# Patient Record
Sex: FEMALE | Race: WHITE | URBAN - METROPOLITAN AREA
[De-identification: names, ages, dates, MRNs, and addresses within clinical notes are randomized per-mention and may not be internally consistent; named-entity substitution may affect disease eponyms.]

---

## 2017-01-01 ENCOUNTER — INFUSION THERAPY VISIT (OUTPATIENT)
Dept: INFUSION THERAPY | Facility: CLINIC | Age: 67
End: 2017-01-01
Payer: MEDICARE

## 2017-01-01 ENCOUNTER — CARE COORDINATION (OUTPATIENT)
Dept: ONCOLOGY | Facility: CLINIC | Age: 67
End: 2017-01-01

## 2017-01-01 ENCOUNTER — TELEPHONE (OUTPATIENT)
Dept: PHARMACY | Facility: CLINIC | Age: 67
End: 2017-01-01

## 2017-01-01 ENCOUNTER — DOCUMENTATION ONLY (OUTPATIENT)
Dept: PHARMACY | Facility: CLINIC | Age: 67
End: 2017-01-01

## 2017-01-01 ENCOUNTER — ONCOLOGY VISIT (OUTPATIENT)
Dept: ONCOLOGY | Facility: CLINIC | Age: 67
End: 2017-01-01
Payer: MEDICARE

## 2017-01-01 ENCOUNTER — MYC REFILL (OUTPATIENT)
Dept: ONCOLOGY | Facility: CLINIC | Age: 67
End: 2017-01-01

## 2017-01-01 ENCOUNTER — TELEPHONE (OUTPATIENT)
Dept: ONCOLOGY | Facility: CLINIC | Age: 67
End: 2017-01-01

## 2017-01-01 VITALS — WEIGHT: 112.6 LBS | BODY MASS INDEX: 20.59 KG/M2

## 2017-01-01 VITALS
SYSTOLIC BLOOD PRESSURE: 101 MMHG | OXYGEN SATURATION: 92 % | HEART RATE: 69 BPM | RESPIRATION RATE: 20 BRPM | TEMPERATURE: 96.6 F | DIASTOLIC BLOOD PRESSURE: 61 MMHG

## 2017-01-01 VITALS
SYSTOLIC BLOOD PRESSURE: 101 MMHG | OXYGEN SATURATION: 95 % | TEMPERATURE: 96.2 F | RESPIRATION RATE: 16 BRPM | HEART RATE: 70 BPM | WEIGHT: 108.5 LBS | BODY MASS INDEX: 19.84 KG/M2 | DIASTOLIC BLOOD PRESSURE: 64 MMHG

## 2017-01-01 VITALS
HEIGHT: 62 IN | SYSTOLIC BLOOD PRESSURE: 111 MMHG | OXYGEN SATURATION: 99 % | HEART RATE: 74 BPM | DIASTOLIC BLOOD PRESSURE: 61 MMHG | RESPIRATION RATE: 15 BRPM

## 2017-01-01 VITALS
HEART RATE: 79 BPM | BODY MASS INDEX: 19.11 KG/M2 | TEMPERATURE: 94.4 F | SYSTOLIC BLOOD PRESSURE: 104 MMHG | RESPIRATION RATE: 16 BRPM | DIASTOLIC BLOOD PRESSURE: 66 MMHG | OXYGEN SATURATION: 93 % | WEIGHT: 104.5 LBS

## 2017-01-01 DIAGNOSIS — C56.9 OVARIAN EPITHELIAL CANCER, UNSPECIFIED LATERALITY (H): ICD-10-CM

## 2017-01-01 DIAGNOSIS — C56.2 MALIGNANT NEOPLASM OF OVARY, LEFT (H): ICD-10-CM

## 2017-01-01 DIAGNOSIS — C56.9 OVARIAN CANCER, UNSPECIFIED LATERALITY (H): ICD-10-CM

## 2017-01-01 DIAGNOSIS — R11.0 NAUSEA: ICD-10-CM

## 2017-01-01 DIAGNOSIS — C56.1 MALIGNANT NEOPLASM OF OVARY, RIGHT (H): Primary | ICD-10-CM

## 2017-01-01 DIAGNOSIS — C56.9 OVARIAN CANCER, UNSPECIFIED LATERALITY (H): Primary | ICD-10-CM

## 2017-01-01 DIAGNOSIS — E87.6 HYPOKALEMIA: Primary | ICD-10-CM

## 2017-01-01 DIAGNOSIS — Z51.11 CHEMOTHERAPY MANAGEMENT, ENCOUNTER FOR: Primary | ICD-10-CM

## 2017-01-01 DIAGNOSIS — E87.6 HYPOKALEMIA: ICD-10-CM

## 2017-01-01 DIAGNOSIS — C56.1 MALIGNANT NEOPLASM OF OVARY, RIGHT (H): ICD-10-CM

## 2017-01-01 DIAGNOSIS — R79.89 ELEVATED SERUM CREATININE: ICD-10-CM

## 2017-01-01 DIAGNOSIS — E86.0 DEHYDRATION: Primary | ICD-10-CM

## 2017-01-01 DIAGNOSIS — I48.20 CHRONIC ATRIAL FIBRILLATION (H): ICD-10-CM

## 2017-01-01 DIAGNOSIS — Z51.11 CHEMOTHERAPY MANAGEMENT, ENCOUNTER FOR: ICD-10-CM

## 2017-01-01 DIAGNOSIS — R63.4 WEIGHT LOSS: ICD-10-CM

## 2017-01-01 DIAGNOSIS — C79.31 BRAIN METASTASIS: ICD-10-CM

## 2017-01-01 DIAGNOSIS — R11.0 NAUSEA: Primary | ICD-10-CM

## 2017-01-01 DIAGNOSIS — C79.31 BRAIN METASTASIS: Primary | ICD-10-CM

## 2017-01-01 DIAGNOSIS — Z74.09 IMMOBILITY: ICD-10-CM

## 2017-01-01 LAB
ALBUMIN SERPL-MCNC: 3.3 G/DL (ref 3.4–5)
ALP SERPL-CCNC: 53 U/L (ref 40–150)
ALT SERPL W P-5'-P-CCNC: 12 U/L (ref 0–50)
ANION GAP SERPL CALCULATED.3IONS-SCNC: 10 MMOL/L (ref 3–14)
ANION GAP SERPL CALCULATED.3IONS-SCNC: 8 MMOL/L (ref 3–14)
ANION GAP SERPL CALCULATED.3IONS-SCNC: 9 MMOL/L (ref 3–14)
AST SERPL W P-5'-P-CCNC: 12 U/L (ref 0–45)
BASOPHILS # BLD AUTO: 0 10E9/L (ref 0–0.2)
BASOPHILS NFR BLD AUTO: 0.4 %
BILIRUB SERPL-MCNC: 0.5 MG/DL (ref 0.2–1.3)
BUN SERPL-MCNC: 21 MG/DL (ref 7–30)
BUN SERPL-MCNC: 26 MG/DL (ref 7–30)
BUN SERPL-MCNC: 31 MG/DL (ref 7–30)
CALCIUM SERPL-MCNC: 9.1 MG/DL (ref 8.5–10.1)
CALCIUM SERPL-MCNC: 9.5 MG/DL (ref 8.5–10.1)
CALCIUM SERPL-MCNC: 9.5 MG/DL (ref 8.5–10.1)
CANCER AG125 SERPL-ACNC: 4052 U/ML (ref 0–30)
CHLORIDE SERPL-SCNC: 106 MMOL/L (ref 94–109)
CHLORIDE SERPL-SCNC: 98 MMOL/L (ref 94–109)
CHLORIDE SERPL-SCNC: 99 MMOL/L (ref 94–109)
CO2 SERPL-SCNC: 26 MMOL/L (ref 20–32)
CO2 SERPL-SCNC: 29 MMOL/L (ref 20–32)
CO2 SERPL-SCNC: 30 MMOL/L (ref 20–32)
CREAT SERPL-MCNC: 0.82 MG/DL (ref 0.52–1.04)
CREAT SERPL-MCNC: 0.91 MG/DL (ref 0.52–1.04)
CREAT SERPL-MCNC: 0.96 MG/DL (ref 0.52–1.04)
DIFFERENTIAL METHOD BLD: ABNORMAL
DIFFERENTIAL METHOD BLD: ABNORMAL
EOSINOPHIL # BLD AUTO: 0.1 10E9/L (ref 0–0.7)
EOSINOPHIL NFR BLD AUTO: 1.1 %
ERYTHROCYTE [DISTWIDTH] IN BLOOD BY AUTOMATED COUNT: 16.1 % (ref 10–15)
ERYTHROCYTE [DISTWIDTH] IN BLOOD BY AUTOMATED COUNT: 16.5 % (ref 10–15)
ERYTHROCYTE [DISTWIDTH] IN BLOOD BY AUTOMATED COUNT: 18.3 % (ref 10–15)
GFR SERPL CREATININE-BSD FRML MDRD: 58 ML/MIN/1.7M2
GFR SERPL CREATININE-BSD FRML MDRD: 61 ML/MIN/1.7M2
GFR SERPL CREATININE-BSD FRML MDRD: 69 ML/MIN/1.7M2
GLUCOSE SERPL-MCNC: 87 MG/DL (ref 70–99)
GLUCOSE SERPL-MCNC: 92 MG/DL (ref 70–99)
GLUCOSE SERPL-MCNC: 92 MG/DL (ref 70–99)
HCT VFR BLD AUTO: 38.4 % (ref 35–47)
HCT VFR BLD AUTO: 39.6 % (ref 35–47)
HCT VFR BLD AUTO: 39.8 % (ref 35–47)
HGB BLD-MCNC: 12.3 G/DL (ref 11.7–15.7)
HGB BLD-MCNC: 12.9 G/DL (ref 11.7–15.7)
HGB BLD-MCNC: 13 G/DL (ref 11.7–15.7)
LYMPHOCYTES # BLD AUTO: 0.3 10E9/L (ref 0.8–5.3)
LYMPHOCYTES # BLD AUTO: 0.4 10E9/L (ref 0.8–5.3)
LYMPHOCYTES NFR BLD AUTO: 3 %
LYMPHOCYTES NFR BLD AUTO: 7.3 %
MCH RBC QN AUTO: 28.4 PG (ref 26.5–33)
MCH RBC QN AUTO: 28.4 PG (ref 26.5–33)
MCH RBC QN AUTO: 28.5 PG (ref 26.5–33)
MCHC RBC AUTO-ENTMCNC: 32 G/DL (ref 31.5–36.5)
MCHC RBC AUTO-ENTMCNC: 32.4 G/DL (ref 31.5–36.5)
MCHC RBC AUTO-ENTMCNC: 32.8 G/DL (ref 31.5–36.5)
MCV RBC AUTO: 87 FL (ref 78–100)
MCV RBC AUTO: 88 FL (ref 78–100)
MCV RBC AUTO: 89 FL (ref 78–100)
MONOCYTES # BLD AUTO: 0.4 10E9/L (ref 0–1.3)
MONOCYTES # BLD AUTO: 0.6 10E9/L (ref 0–1.3)
MONOCYTES NFR BLD AUTO: 12 %
MONOCYTES NFR BLD AUTO: 4 %
NEUTROPHILS # BLD AUTO: 4.2 10E9/L (ref 1.6–8.3)
NEUTROPHILS # BLD AUTO: 8.4 10E9/L (ref 1.6–8.3)
NEUTROPHILS NFR BLD AUTO: 79.2 %
NEUTROPHILS NFR BLD AUTO: 93 %
PLATELET # BLD AUTO: 124 10E9/L (ref 150–450)
PLATELET # BLD AUTO: 128 10E9/L (ref 150–450)
PLATELET # BLD AUTO: 78 10E9/L (ref 150–450)
POTASSIUM SERPL-SCNC: 3.3 MMOL/L (ref 3.4–5.3)
POTASSIUM SERPL-SCNC: 3.3 MMOL/L (ref 3.4–5.3)
POTASSIUM SERPL-SCNC: 4.2 MMOL/L (ref 3.4–5.3)
PROT SERPL-MCNC: 6.4 G/DL (ref 6.8–8.8)
RBC # BLD AUTO: 4.33 10E12/L (ref 3.8–5.2)
RBC # BLD AUTO: 4.53 10E12/L (ref 3.8–5.2)
RBC # BLD AUTO: 4.58 10E12/L (ref 3.8–5.2)
SODIUM SERPL-SCNC: 137 MMOL/L (ref 133–144)
SODIUM SERPL-SCNC: 138 MMOL/L (ref 133–144)
SODIUM SERPL-SCNC: 140 MMOL/L (ref 133–144)
WBC # BLD AUTO: 5.2 10E9/L (ref 4–11)
WBC # BLD AUTO: 5.7 10E9/L (ref 4–11)
WBC # BLD AUTO: 9.1 10E9/L (ref 4–11)

## 2017-01-01 PROCEDURE — 99207 ZZC NO CHARGE NURSE ONLY: CPT

## 2017-01-01 PROCEDURE — 36591 DRAW BLOOD OFF VENOUS DEVICE: CPT

## 2017-01-01 PROCEDURE — 99215 OFFICE O/P EST HI 40 MIN: CPT | Mod: 25 | Performed by: OBSTETRICS & GYNECOLOGY

## 2017-01-01 PROCEDURE — 85027 COMPLETE CBC AUTOMATED: CPT | Performed by: OBSTETRICS & GYNECOLOGY

## 2017-01-01 PROCEDURE — 80048 BASIC METABOLIC PNL TOTAL CA: CPT | Performed by: OBSTETRICS & GYNECOLOGY

## 2017-01-01 PROCEDURE — 96366 THER/PROPH/DIAG IV INF ADDON: CPT

## 2017-01-01 PROCEDURE — 85025 COMPLETE CBC W/AUTO DIFF WBC: CPT | Performed by: OBSTETRICS & GYNECOLOGY

## 2017-01-01 PROCEDURE — 96366 THER/PROPH/DIAG IV INF ADDON: CPT | Performed by: INTERNAL MEDICINE

## 2017-01-01 PROCEDURE — 99207 ZZC NO CHARGE LOS: CPT

## 2017-01-01 PROCEDURE — 96365 THER/PROPH/DIAG IV INF INIT: CPT

## 2017-01-01 PROCEDURE — 86304 IMMUNOASSAY TUMOR CA 125: CPT | Performed by: OBSTETRICS & GYNECOLOGY

## 2017-01-01 PROCEDURE — 80053 COMPREHEN METABOLIC PANEL: CPT | Performed by: OBSTETRICS & GYNECOLOGY

## 2017-01-01 PROCEDURE — 80048 BASIC METABOLIC PNL TOTAL CA: CPT | Performed by: INTERNAL MEDICINE

## 2017-01-01 PROCEDURE — 96365 THER/PROPH/DIAG IV INF INIT: CPT | Performed by: INTERNAL MEDICINE

## 2017-01-01 PROCEDURE — 99215 OFFICE O/P EST HI 40 MIN: CPT | Performed by: OBSTETRICS & GYNECOLOGY

## 2017-01-01 RX ORDER — HEPARIN SODIUM (PORCINE) LOCK FLUSH IV SOLN 100 UNIT/ML 100 UNIT/ML
5 SOLUTION INTRAVENOUS
Status: DISCONTINUED | OUTPATIENT
Start: 2017-01-01 | End: 2017-01-01 | Stop reason: HOSPADM

## 2017-01-01 RX ORDER — ALBUTEROL SULFATE 0.83 MG/ML
1 SOLUTION RESPIRATORY (INHALATION) EVERY 6 HOURS PRN
COMMUNITY

## 2017-01-01 RX ORDER — LORAZEPAM 0.5 MG/1
0.5 TABLET ORAL EVERY 6 HOURS PRN
Qty: 30 TABLET | Refills: 1 | Status: SHIPPED | OUTPATIENT
Start: 2017-01-01

## 2017-01-01 RX ORDER — LORAZEPAM 0.5 MG/1
0.5 TABLET ORAL EVERY 6 HOURS PRN
Qty: 20 TABLET | Refills: 1 | Status: SHIPPED | OUTPATIENT
Start: 2017-01-01 | End: 2017-01-01

## 2017-01-01 RX ORDER — POTASSIUM CHLORIDE 29.8 MG/ML
20 INJECTION INTRAVENOUS
Status: DISCONTINUED | OUTPATIENT
Start: 2017-01-01 | End: 2017-01-01 | Stop reason: HOSPADM

## 2017-01-01 RX ORDER — PROCHLORPERAZINE MALEATE 10 MG
5 TABLET ORAL EVERY 6 HOURS PRN
Qty: 30 TABLET | Refills: 0 | Status: SHIPPED | OUTPATIENT
Start: 2017-01-01

## 2017-01-01 RX ADMIN — HEPARIN SODIUM (PORCINE) LOCK FLUSH IV SOLN 100 UNIT/ML 5 ML: 100 SOLUTION at 17:01

## 2017-01-01 RX ADMIN — HEPARIN SODIUM (PORCINE) LOCK FLUSH IV SOLN 100 UNIT/ML 5 ML: 100 SOLUTION at 10:21

## 2017-01-01 RX ADMIN — HEPARIN SODIUM (PORCINE) LOCK FLUSH IV SOLN 100 UNIT/ML 5 ML: 100 SOLUTION at 13:56

## 2017-01-01 RX ADMIN — HEPARIN SODIUM (PORCINE) LOCK FLUSH IV SOLN 100 UNIT/ML 5 ML: 100 SOLUTION at 14:46

## 2017-01-01 RX ADMIN — HEPARIN SODIUM (PORCINE) LOCK FLUSH IV SOLN 100 UNIT/ML 5 ML: 100 SOLUTION at 13:06

## 2017-01-01 ASSESSMENT — PAIN SCALES - GENERAL
PAINLEVEL: NO PAIN (0)

## 2017-01-04 NOTE — TELEPHONE ENCOUNTER
patient calling for refill of ativan for the patient  Last visit with MD 11/14/16  Last order date     LORazepam (ATIVAN) 0.5 MG tablet (Discontinued) 20 tablet 1 7/11/2016 1/4/2017 No     Sig: Take 1 tablet (0.5 mg) by mouth every 6 hours as needed for anxiety or other (nausea/vomiting, sleep)   RX approved per chemo nurse Linda per standing order  Please advise on refills for patient

## 2017-01-04 NOTE — TELEPHONE ENCOUNTER
Message from MyChart:  Original authorizing provider: MD Jaziel Rodriguezeth JIMBO Young would like a refill of the following medications:  LORazepam (ATIVAN) 0.5 MG tablet [Gema Moody MD]    Preferred pharmacy: [facility] [address] [place] [address] [address] AT [facility]    Comment:

## 2017-01-11 NOTE — TELEPHONE ENCOUNTER
After months of working on getting Mekinist for this pt, I am sorry to say that I was unable to find anything.  I appealed the insurance twice, all foundation grants are either closed or the one that is open wont even cover one cycle of med.  I reached out to [employer] and they will not quin any financial assistance due to household income and [employer] ([employer]) could not help as well.  The only compassionate use for Mekinist is a liquid formulation and is only given to patients who have documented issues with swallowing.  I wish I could have been more assistance.    Alta Dee, [facility]  Pharmacy Liaison/Coordinator  [facility]

## 2017-01-16 NOTE — PROGRESS NOTES
"Patient's name and  were verified.  See Doc Flowsheet - IV assess for details.  IVAD accessed with 20G 3/4\" lou gripper plus needle  blood return positive: YES  Site without redness, tenderness or swelling: YES  flushed with 30cc NS and 5cc 100u/ml heparin  Needle: de-accessed   Comments: Labs drawn.  Patient tolerated procedure without incident.    Olena Carty RN  BSN    "

## 2017-01-17 NOTE — PROGRESS NOTES
Infusion Nursing Note:  Elayne Young presents today for IVF/Potassium.    Patient seen by provider today: No    Note: Eating and drinking very little    Intravenous Access:  Implanted Port.    Treatment Conditions:  HGB     12.9   1/16/2017  WBC      5.2   1/16/2017   ANEU      4.2   1/16/2017  PLT      128   1/16/2017     NA      138   1/16/2017                POTASSIUM      3.3   1/16/2017        MAG      1.9   10/20/2016         CR     0.91   1/16/2017                TONY      9.5   1/16/2017             BILITOTAL      0.3   10/20/2016        ALBUMIN      3.5   10/20/2016                 ALT       12   10/20/2016        AST       15   10/20/2016      Post Infusion Assessment:  Patient tolerated infusion without incident.  Blood return noted pre and post infusion.  Site patent and intact, free from redness, edema or discomfort.  No evidence of extravasations.  Access discontinued per protocol.    Discharge Plan:   Copy of AVS reviewed with patient and/or family.  Patient will return Monday for next appointment.  Patient discharged in stable condition accompanied by: .  Departure Mode: Wheelchair.    JACKIE CLAY RN

## 2017-01-19 NOTE — PROGRESS NOTES
Follow Up Notes on Referred Patient    Date: 2017          RE: Elayne Young  : 1950  HAILE: 2017        Elayne Young is a 66 year old woman with a history of stage IV ovarian cancer, diagnosed in . Chemotherapy agents used include Carbo/taxol, Doxil, Topotecan, Gemcitabine, Cisplatin, Etoposide and weekly Taxol. She took a chemotherapy break Oct 2014. CT scan on 7/8/15 showed progression of disease, she started Letrozole and then completed 3 cycles of Carbo desensitization. CT on 11/6/15 showed interval progression/worsening of the neoplastic process, as indicated by increase in size of right adnexal mass lesion, retroperitoneal and mediastinal lymph nodes, and peritoneal/mesenteric tumor implants and her  was elevated to 2064. Most recently was on niraparib Tesaro trial but came off due to weight loss and feeling poorly. Wanted a month break.  Wanted to do NCI Match trial but history of severe aortic stenosis and would therefore not qualify based on her cardiac status. Her last ECHO was performed in 3/2016-had a repeat ECHO to evaluate her cardiac status to see if this has improved enough for MATCH trial.      16- ECHO:  Global and regional left ventricular function is normal with an EF of 60-65%.  Left ventricular size is normal. Moderate concentric wall thickening  consistent with left ventricular hypertrophy is present. Grade II or moderate  diastolic dysfunction.  The aortic valve is heavily calcified. There is severe stenosis (Pk velocity  5.88m/s; Mn grad 74mmHg; MYRANDA 0.40cm2) and mild insufficiency.    16 - 12/10/16: She ended up having a balloon valvuloplasty on 16 with Dr. Talib Wyatt at [facility].  Admitted to hospital for slurred speech - MRI showed small stroke and brain metastases. Final Diagnosis:  1. Symptomatic, very severe aortic stenosis with mean gradient approximately 80 mmHg across the aortic  valve status post successful balloon aortic valvuloplasty December 9, 2016. Reduction in gradient to 51 mmHg on transthoracic echocardiogram December 10, 2016.  2. Transient ischemic attack, following balloon aortic valvuloplasty , resolved.  3. Metastatic ovarian cancer status post recent completion of brain radiation.  4. Paroxysmal atrial fibrillation.  5. Hypertension.  6. History of DVT on anticoagulation.    12/10/16: MRI brain: Multiple enhancing intra-axial lesions are seen. An approximately 5.6 x 4.6 mm mass medial left cerebellum and vermis, 5.4 x 4.5 mm enhancing mass right temporal lobe, 4.8 x 4.3 mm enhancing mass right posterior temporal, 3.6 x 3 mm mass medial right basal ganglia, 1.3 x 1.2 cm mass left frontal lobe, 2.7 x 2.7 mm mass medial right frontal lobe, 1.2 x 1.1 cm mass anterior right frontal lobe and possible 3 mm mass within the medial right occipital lobe.. Focus of diffusion hyperintensity with restriction seen within the corona radiata on the left measuring approximately 7 mm in size with a punctate focus of diffusion hyperintensity measuring approximately 2 to 3 mm in size involving the cortex of the left frontal lobe without definite enhancement most worrisome for superimposed small acute to subacute infarcts. Moderate degree of perilesional edema is seen associated with the 2 largest masses which are seen within the frontal lobes. The dominant mass within the right frontal lobe corresponds to the small area of increased density on the CT scan of the head which may be related to hemorrhage, blood and/or mucin. Mild to minimal fransisco-lesional edema associated with the additional masses. Superimposed multiple nonspecific foci of T2 and FLAIR hyperintensity seen involving the brain parenchyma. There is underlying mild volume loss. Normal flow-voids are seen within the major arterial and venous structures. Probable multiple small mucous retention cysts within the floor of the left maxillary  sinus. Mild mucosal membrane thickening throughout the remainder of the paranasal sinuses. The mastoid air cells appear unopacified.      12/17/16: admitted to ED again for bowel obstruction - abdominal pain and vomiting.     12/27/16: visited radiation oncology at [facility] -       TODAY:  She reports to clinic today with her . Ca125 as of 1/16/17 was 4052. She reports that on 12/10/16 she was admitted to the hospital due to slurred speech. MRI was done and she was told that she had a small stroke. There were also multiple (about 7-8) brain metastases noted at that time. Then on 12/17/16, she returned to the ED for severe abdominal pain and vomiting. She was found to have a bowel obstruction however surgery could not be done. On 12/27/16 she visited [facility] Rad Onc clinic to discuss recent events. Today she reports to clinic with her  and has been feeling better in the last week. 2 weeks ago she was vomiting at least once a day, sometimes more. This was post-radiation therapy. She reports today that the last week has been a great improvement, vomiting much less and she has been able to eat more. She has lost a significant amount of weight however, around 20-22 lbs since she last visited the clinic. Regarding her valvuloplasty and hospital visit, no g tube was placed. She is having normal bowel movements and no urinary problems. Future treatment and plans were discussed. She and her  are concerned about her health and her plans as of now are to work on getting stronger and improving her health. She has been eating and drinking more, however she has been dehydrated for the past few weeks. She spends most of her day in bed - she gets up at around 10:30am and walks downstairs, she then usually takes a nap and is in bed for 12 hours or more during the day. She is able to walk around her house without using a wheelchair and she feel comfortable at home. They are looking into visiting a nutritionist  and they recently visited palliative care who greatly improved her nausea/vomiting. I recommended they visit a physical therapist to improve her strength and mobility. Elayne would like to reevaluate her health in 1-2 months and potentially start treatment again, depending on her well being.       History:  She was diagnosed in 2009 with stage IV carcinoma of the ovary vs peritoneum after a + biopsy of a left supraclavicular node (?--patient does not think this occurred). Due to this + SC LN and extensive intraperitoneal disease she was recommended to undergo neoadjuvant chemotherapy with plan for debulking if there was response to chemotherapy. She then underwent treatment with 6 cycles of carbo and taxol without adequate response with persistence of disease noted on CT scan. Her initial  was 559. In 1/2010 she then started Doxil and had 5 cycles, then (6/2010) started Topotecan weekly for 16 cycles. 8/2011 CT scans again indicate persistence of disease without change from previous scans. She then had 11 cycles of Gemcitabine (9/2011) that was completed in 6/2012. 7/2012 she started 5 cycles Cisplatin which was discontinued after severe reaction during the 5th cycle. Her  was noted to be increasing, with a stable CT scan.     7/6/2009: Date Received: [id number]  DIAGNOSIS:  Uterine cervix, biopsy:  1. Multiple fragments of benign polypoid cervical mucosa with Nabothian cyst formation.  2. No evidence of dysplasia or neoplasm.  7/6/2009: DIAGNOSIS: Left supraclavicular lymph node, percutaneous fine needle aspiration: POSITIVE for malignant cells (adenocarcinoma).  SITE: Left supraclavicular node fine needle aspiration  GROSS DESCRIPTION: The specimen designated left supraclavicular node fine needle aspiration consists of 4 smears, which are submitted for cytologic study.  SGL/amf  MICROSCOPIC DESCRIPTION: The smears contain moderate numbers of aggregates of cytologically malignant cells with morphologic  features of adenocarcinoma cells, which are predominantly present in large and small papillary and 3-dimensional aggregates. Some histiocyte-like cells and modest numbers of red cells are also included. Among the cytologically malignant cells, occasional cells are identified which contain central cytoplasmic vacuoles, with eccentric placement of nuclei, focally suggestive of signet type cells. However, the findings are not diagnostic of signet cell carcinoma in this material. No areas of unequivocal psammomatous calcification are seen.  No normal range   : 559, 506, 707, 729, 1136, 1094, 1264, 1215, 1270, 1291, 1395  1/2010: Doxil 40 mg/m2 q 28 days. CT after 2 cycles showed stable disease. Received 5 cycles. 2nd opinion at [facility] recommended single agnet palliative chemo as well. May benefit from weakly.  6/2010: Weekly Topotecan at 4 mg/m2 at 1, 8 and 15 q28 days/ Second cycle decreased to 3.2 mg/m2 due to cytopenia. Continued to 16 cycles and Aug 2011 scan showed progression  9/2011: Gemcitabine at 800 mg/m2 day 1 and 15 for 28 days. Cycle two at 700 mg/m2 day 1 and 8 every 21 days. Neutrophils were low at day 8, so she was tx at day 15 instead.   Completed 11 cycles.  5/14/12: DIAGNOSIS:  Ascites fluid (see S-): Positive for carcinoma (see comment).  COMMENT: An immunoperoxidase panel is attempted on the cell block material. The neoplastic clusters are negative for CK5/6, CK20 and calretinin. They are positive for CK7. 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 A primary peritoneal carcinoma cannot be definitely excluded. Colleague CHRISTIANO has reviewed selected slides and concurs.   Diagnosis previously reported on 7/16/2009. Additional diagnostic information added to the end of the report. The original evaluation and diagnosis was performed by Dr. Issa Chen.  HORMONE RECEPTOR ANALYSIS BY IHC  ESTROGEN RECEPTOR INTERPRETATION: POSITIVE  Percent staining:  10-20% Intensity: Weak  Grading Criteria: Semi-quantitative  Positive > 9%; Low Positive 1-9%; Negative < 1%  Internal and external controls: Adequate  Standard assay conditions: Met  This is a cytology cell block preparation; standard processing was not possible and the results may be affected.  7/2012: Distal DVT seen rt leg, started on Warfarin.  8/2012: Cisplatin 20 mg/m2 weekly for 3 weeks q28 days.  11/2012 she started Etoposide for which she has completed 6 cycles with min SE most of which involve fatigue, anemia, and SOB  Receives paracentesis about every 6 to 5 weeks for ascites accumulation.  Recent CT 3/13/2013  scan reveals peritoneal carcinomatosis, with large complex cystic pelvic mass that is grossly unchanged from previously films. There was new note of mild dilation of bowel loops.  4/5/13: IR paracentesis. US demonstrates non loculated ascites.   Second opinion with Dr. Martinez recommended cytology from paracentesis for chemosensitivity testing and weekly taxol for 6 weeks.   Sensitivity testing sent and showed resistance to all drugs tested. Most already tried.  7/1/13: CT C/A/P Impression:  1. Interval decrease ascites  2. Slight increase of 4.2 x 2.5 cm cystic mass at dome of liver and mild right hydronephrosis.   3. Otherwise stable appearance of large ovarian masses and metastatic disease.  Started on weekly Taxol: 6 weeks in a row for 7 weeks. 50 mg/m2  10/21/13: CT C/A/P Impression:  1. Findings suspicious for degree of partial obstruction in distal small bowel. The point of transition is not readisly identified. Thickening portions of distal small bowel due to other eitiology such as nonspecific enteritis is felt less likely.  2. In abdomen/pelvis, cystic masses coarsely calcified implants overall have not appreciable changed.  3. In chest, there is little change. Areas of thoracic lymphadenopathy,some of which is calcified is largely unchanged, but subcarinal lymphadenopathy appears to have  slightly decreased in size.  By Jan 2014, had completed 10 cycles Taxol  1/27/14: CT C/A/P impression:  1. Moderate amount ascites in abdomen/and pelvis is increased.  2. Cystic pelvis masses and numerous coarsely calcified implants in abdomen/pelvis not isgnificantly changed.  3. Right double-J ureteral stent in place without evidence of hydronephrosis.  4. Stable appearance of chest.  5. Mildly dilated small bowel loops have resolved.  5/5/14: CT C/A/P Impression:  1. Moderate to large amount intra-abdominal ascites appear similar.  2. Cystic pelvic mass and numerous coarsely calcified implants in abdomen and pelvis are similar.  3. Subcapsular fluid collection adjacent to liver again see.One area may be new and one collection inferiorly to rt lobe may be increased. Largest collection superiorly is stable.  4. Prominent dilation of right renal collecting system. Question right ureteral stent dysfunction. Suggest urology eval.  5. 6 mm nodular area of right lung base medially. Could be scarring. Follow up.  6. Remainder appears stable.  5/19/14: Cystoscopy and right stent replacement  8/11/14: CT C/A/P Impression:  1. No significant change compared with 5/5/14. No CT evidence of progressive metastatic disease. Prior treated and densely calcified mediastinal lymph nodes diffuse and densely calcified peritoneal implants stable. Partially loculated ascites in slightly decreased compared with May. Dominant multiloculated cystic pelvic mass without change.  2. Improved right hydronephrosis compared to prior. Mild fullness of rt collecting system remains with ureteral stent in good position.  9/30/14:  295  10/21/14:  320  8/11/14: CT C/A/P Impression:  1. New somewhat linear 7 mm density within lingula may be on basis of atelectasis. Continued follow up needed. Unchanged 6 mm density in right lung base.  2. Moderate marked hydronephrosis on the rt has increased slightly without double-j ureteral stent in  place.  3. No significant change in large multiloculated cystic and calcified pelvic masses.   4. Multiple calcified peritoneal implants unchanged  5. No significant change in partially loculated diffuse low-density abdominal fluid.  Completed 10 cycles weekly taxol. Go forward with observation for 3 months and then repeat scan  12/4/14: CT C/A/P Impression:  1. Moderate bilateral pleural effusions with adjacent atelectasis.  2. Moderate ascites somewhat decrease superiorly and increased inferiorly compared to prior CT> Portions of this loculated including a possible subcapsular portion adjacent to liver. Multiple calcifications present.  3. No change in partially calcified multiloculated cystic mass in pelvis, presumably ovarian malignancy.  4. Right ureteral stent in place with mild prominence of collecting system in rt kidney.  12/18/15: CT C/A/P Impression:  1. Considerable interval increase ascites with large loculated appearing components particularly marked in anterior pelvis with right flank.  2. Mildly dilated proximal to mid small bowel loops with scattered air-fluid levels suggesting partial obstruction. Proximal small bowel loops were also somewhat prominent previously although mid small bowel loops slightly more prominent on current study.  3. No change in partly calcified 7 x 14 cm pelvic mass consistent with ovarian malignancy. Scattered calcified tumor implants unchanged.  4. Moderate bilateral pleural effusions. Unchanged on right but increased on left. There is adjacent atelectasis.  5. Right ureteral stent. Collecting system of left kidney more prominent than 12/4/14.  3/2/15:  315  3/17/15: CT C/A/P: IMPRESSION:  1. No evidence of progression of metastatic ovarian cancer. Stable partially calcified cystic/solid right adnexal mass, soft tissue density and calcified peritoneal implants and calcified mediastinal and abdominal/pelvic lymph nodes.   2. Although the amount of loculated ascites is  markedly reduced since 12/18/2014, there are new discrete rim-enhancing air-fluid collections involving the right paracolic gutter and anterior pelvis, which are concerning for abscesses.  3. Decreased dilated loops of small bowel since 12/18/2014. Matted loops of small bowel in the anterior pelvis may predisposes patient to future small bowel obstruction.  4. No evidence of hydronephrosis with right nephroureteral stent in place.  5. Resolution of bilateral pleural effusions.    7/8/15: CT C/A/P: IMPRESSION: In this patient with a history of ovarian cancer:  1. Progression of disease with multiple enlarging implants as detailed above. Minimal enlargement of the primary mixed solid cystic and calcified mass arising from the right adnexa.  2. Complex rim-enhancing fluid collection in the midpelvis has slightly decreased in size.  3. No ascites.     7/13/15: Treatment planning with Dr. Moody.She has been feeling well off-chemotherapy. Her appetite is good. No nausea or vomiting. No issues with bowels or bladder. Denies any vaginal or rectal bleeding. She did have a mild upper respiratory infection and was placed on antibiotics. She is still having numbness/tingling in her fingers. She otherwise has been doing very well. She is still scheduled to take an 8 day cruise through [place] 8/2015 and has her son's wedding 9/2015.     Plan: Start Letrozole 2.5 mg daily.     8/10/15:  1792.  8/26/15:  2102.    Plan: Given  increased to 2102; discussed with Dr. Moody who recommends starting Carbo desensitization with Neulasta.    9/2/15: Cycle #1 inpatient Carbo desensitization.   9/30/15: Cycle #2 inpatient Carbo desensitization.  1900. She has afib and a hypotensive episode; evaluated by Cards with TTE (normal LV function, EF 60-65; severe aortic stenosis). Started on Warfarin.  10/21/15: Cycle #3 inpatient Carbo desensitization.  2268.    11/6/15:  2064. CT c/a/p IMPRESSION:  1. In this  "patient with metastatic ovarian cancer, overall findings are indicative of interval progression/worsening of the neoplastic process, as indicated by increase in size of right adnexal mass lesion, retroperitoneal and mediastinal lymph nodes, and peritoneal/mesenteric tumor implants.  2. A few scattered pulmonary nodules appearing more prominent (compared to 7/8/2015). Consider reevaluation on followup imaging.  3. Mild compression fractures of the vertebral bodies of T12, L1, and L2, new since 7/8/2015.  4. Calcification of the aortic valve leaflets with hypertrophic appearance of the left ventricle, consistent with history of aortic valvular stenosis.     11/9/15: Treatment planning.Recommend she continue with chemotherapy on carboplatin AUC 5-dose decreased. Patient would like to reduce the carboplatin dosage. Also recommend she stop coumadin and start Lovenox due to fear of erratic INR levels . We will consider avastin in the future. Guardant 360 test was drawn today.    11/20/15: Cycle #4 inpatient Carbo desensitization.    12/2/15: Cycle #5 inpatient Carbo desensitization planned for 12/9.  2053.    12/15/15:  2301    1/11/16:  3025. Treatment planning. \"For now patient can either choose to take a break from chemo or continue with therapy until NCI Match trial opens again. Patient wants to proceed with Doxil (has not seen this since 2010) until we are able to get the patient on the NCI match trial.\"    1/14/16: MUGA EF 69.3%  1/15/16: Cycle #1 Doxil.    2/10/16: Cycle #2 Doxil.   3085.  3/9/16: Cycle #3 Doxil planned for 3/11.  3710.    4/4/16: CT cap         Subcentimeter hypodensities and irregular enhancement of the thyroid is unchanged. Main pulmonary artery is mildly dilated. Coronary artery calcifications. Calcific atherosclerosis of the aortic arch. No significant change in size of periaortic lymph node measuring approximately 8 mm (series 2, image 33) and 6 mm retrocrural " lymph node (series 2 image 46), stable.     Basilar atelectasis, not significantly changed. 3.5 mm noncalcified pulmonary nodule of the left lower lobe (series 6, image 76), appears more prominent than 11/6/2015 when measured 3 mm.     Abdomen and Pelvis:          15.8 x 9.4 cm cystic solid mass of the right adnexa previously measuring 14.6 x 8.4 cm when measured in similar transaxial dimensions. The mass lesion abuts the urinary bladder and displaces  multiple bowel loops.     Interval increase in size of multiple retroperitoneal and mesenteric soft tissue implants again appreciated including a 4.3 x 5.2 cm cystic solid mass at the gastrohepatic ligament, previously 2.9 x 3.8 cm. 2.3 cm right abdominal wall focus (series 2, image 65), stable. Slight  increase in numerous centrally hypodense enhancing retroperitoneal lymph nodes including a 19 mm left para-aortic lymph node, previously 16 mm. Lymph nodes encase but do not obstruct the renal arteries bilaterally. Prominent left inguinal lymph node measuring 12 mm, stable. Slight decrease in centrally hypodense lenticular focus overlying the anterior surface of the right hepatic lobe measuring approximately 8 mm in thickness (series 2 image 55), previously 16 mm.    Right double-J ureteral stent is stable in position. The hepatic parenchyma, spleen, adrenal glands, and pancreas are unremarkable. No free fluid or free air within the abdomen or pelvis.     Bones and soft tissues: Unchanged enhancing soft tissue deposits of the abdominal musculature including a right abdominal wall lesion measuring 17 x 26 mm (series 2, image 76), stable. Stable compression deformities of T12-L2.           Impression: In this patient with history of recurrent ovarian cancer:  1. Increase in size of primary cystic/solid right adnexal mass when compared to 11/6/2015.  2. Increase in size of numerous soft tissue deposits, most conspicuous in the upper abdomen near the celiac artery.  3.  Increase in size of numerous retroperitoneal lymph nodes which appear to encase but do not obstruct the renal arteries.    4/11/16:   3887.  4/21/16: abdominal mass biopsy with serous carcinoma  5/2/16:   3454. CT cap Impression:    This patient with history of ovarian cancer:  1.  No significant change in large pelvic mass since 4/4/2016.  2.  Stable to slightly decreased adenopathy in the abdomen as described since 4/4/2016.  3.  No significant change in enlarged subcarinal lymph node and adrenal gland thickening since 4/4/2016.  4.  No significant change in 4 mm left lower lobe pulmonary nodule since at least 11/06/2015.  5.  Tethering of some small bowel loops in the left lower quadrant caused by a metastatic deposit, with borderline dilation and fecalization of small bowel proximal to the site of tethering.  While not currently severely obstructed, site is susceptible to obstruction given tethering and slow transit.    5/16/16: Cycle #1 Niraparib.   3348.  6/1/16: Cycle #1 D 15 delayed secondary to thrombocytopenia.    6/6/16: She is here today for visit prior to D22 Cycle #1 Niraparib-holding this week due to thrombocytopenia.  6/13/16: Cycle #2 Niraparib.  3764.    6/30/16: Stent changed.  7/11/16:  3375  7/13/16 CT CAP Impression:    In this patient with history of ovarian cancer:  1.  Slightly increased fluid about the anterior margin of the liver and near the caudate lobe.  This is concerning for progression of disease.  Attention on followup imaging is recommended.  2.  No significant change in large pelvic mass since 5/2/2016 or 04/4/2016.  3.  Stable adenopathy in the abdomen.  Stable soft tissue masses in the abdominal wall and along the greater curvature of the stomach.  4.  Enlarged subcarinal lymph node is not significantly changed to slightly enlarged since 5/2/2016.  5.  Pulmonary nodule in the left lower lobe is not significantly changed since at least 11/6/2015.  No  new pulmonary nodule.  6.  Persistent tethering of some small bowel loops in the left lower quadrant, with resolved fecalization and dilation of the bowel adjacent to this since 5/2/2016.    7/18/16: she comes to clinic feeling fatigued, but doing better, went for dinner out last night and had soup. No emesis. Wants to proceed with Niraparib at 100 mg dose. Will take before bed with compazine.  Plan Niraparib at 100 mg/day today due to thrombocytopenia has been dose reduced to 100, had held for plts to 130 from 75 now; will recheck CBC and BMP weekly.Brittani, , saw Elayne today CT CAP is stable disease. If patient unable to continue 100 mg/day plan to change to  [facility] match trial. Will take compazine 30 min before drug at night before bed.     8/8/16: Cycle #4 Niraparib.    3386.    9/6/16: CT cap IMPRESSION:  In this patient with a history of ovarian cancer:    1. Trace interval increase in size of multiple small solid pulmonary nodules, continued attention on follow-up. Right subcarinal lymph node is larger than on 7/8/2015, smaller than on 3/17/2015.  2. Improvement in the multiple subcutaneous soft tissue nodules of the abdomen. Soft tissue nodules within the abdominal wall musculature are stable from 7/13/2016.  3. Loculated fluid anterior to the liver slightly decreased from 7/13/2016.  4. No significant change in large multicystic pelvic mass from 4/4/2016.  5. Stable adenopathy of the abdomen including the celiac axis and retroperitoneum.  6. Tethering of the small bowel loops in the left lower quadrant with significant dilatation.    9/7/16: Cycle #5 Niraparib.  3207    9/22/16:Tesaro trial but no appetite and wanted to go off the trial - Patient taken off Tesaro Trial.    10/2016: has reduced to 2910         Review of Systems:    Systemic           no weight changes; no fever; no chills; no night sweats; no appetite changes  Skin           no rashes, or lesions  Eye            no irritation; no changes in vision  Sancho-Laryngeal           no dysphagia; no hoarseness   Pulmonary    no cough; no shortness of breath  Cardiovascular    no chest pain; no palpitations  Gastrointestinal    no diarrhea; no constipation; intermittent abdominal pain; no changes in bowel habits; no blood in stool; poor appetite  Genitourinary   no urinary frequency; no urinary urgency; no dysuria; no pain; no abnormal vaginal discharge; no abnormal vaginal bleeding  Breast    no breast discharge; no breast changes; no breast pain  Musculoskeletal    no myalgias; no arthralgias; no back pain  Psychiatric           no depressed mood; no anxiety    Hematologic               no tender lymph nodes; no noticeable swellings or lumps   Endocrine    no hot flashes; no heat/cold intolerance         Neurological   no tremor; no numbness and tingling; no headaches; no difficulty sleeping      Past Medical History:    Past Medical History   Diagnosis Date     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     Stage IV primary peritoneal cancer diagnosed in 2009,      Personal history of DVT (deep vein thrombosis) 4/30/2013 2012, right lower extremity      BP (high blood pressure) 4/30/2013     Depression 4/30/2013     Lipids blood increased 4/30/2013     TIA (transient ischaemic attack) 4/30/2013     Incontinence of urine      Anemia      Chemotherapy related     Fatigue      Acute on chronic diastolic heart failure (H) 10/1/2015         Past Surgical History:    Past Surgical History   Procedure Laterality Date     Abdominal paracentesis  2009     Multiple proceedures     Schwannoma  2013     Excision from left hand         Health Maintenance Due   Topic Date Due     HF ACTION PLAN Q3 YR (NO INBASKET MSG)  1950     HETAL QUESTIONNAIRE 1 YEAR  1950     PHQ-9 Q1YR (NO INBASKET)  1950     LIPID MONITORING Q1 YEAR( NO INBASKET)  10/27/1950     TETANUS IMMUNIZATION (SYSTEM ASSIGNED)  10/27/1967     ADVANCE DIRECTIVE  PLANNING Q5 YRS (NO INBASKET)  10/27/1967     HEPATITIS C SCREENING  10/27/1967     MAMMO SCREEN Q2 YR (SYSTEM ASSIGNED)  10/27/1989     COLON CANCER SCREEN (SYSTEM ASSIGNED)  10/27/1999     FALL RISK ASSESSMENT  10/27/2014     DEXA SCAN SCREENING (SYSTEM ASSIGNED)  10/27/2014     PNEUMOCOCCAL (1 of 2 - PCV13) 10/27/2014     INFLUENZA VACCINE (SYSTEM ASSIGNED)  09/01/2016       Current Medications:     Current Outpatient Prescriptions   Medication Sig Dispense Refill     UNABLE TO FIND zypresa       hyoscyamine (LEVSIN/SL) 0.125 MG sublingual tablet Place 0.125 mg under the tongue       LORazepam (ATIVAN) 0.5 MG tablet Take 1 tablet (0.5 mg) by mouth every 6 hours as needed for anxiety or other (nausea/vomiting, sleep) 20 tablet 1     trametinib (MEKINIST) 2 MG tablet Take 1 tablet (2 mg) by mouth daily Take 1 hr before or 2 hrs after a meal. STORE and DISPENSE from original container. 30 tablet 0     pantoprazole (PROTONIX) 40 MG enteric coated tablet Take 1 tablet (40 mg) by mouth daily Take 30-60 minutes before a meal. 30 tablet 1     dexamethasone (DECADRON) 4 MG tablet        GENERLAC 10 GM/15ML solution        potassium chloride SA (K-DUR,KLOR-CON M) 10 MEQ tablet        sertraline (ZOLOFT) 50 MG tablet        prochlorperazine (COMPAZINE) 10 MG tablet Take 0.5 tablets (5 mg) by mouth every 6 hours as needed for nausea or vomiting 30 tablet 0     potassium chloride (K-DUR) 10 MEQ tablet Take 2 tablets (20 mEq) by mouth 2 times daily 30 tablet 1     cyclobenzaprine (FLEXERIL) 10 MG tablet Take 1 tablet (10 mg) by mouth 2 times daily as needed 20 tablet 0     enoxaparin (LOVENOX) 80 MG/0.8ML syringe Inject 0.7 mLs (70 mg) Subcutaneous every 12 hours (Patient taking differently: Inject 60 mg Subcutaneous every 12 hours ) 60 Syringe 3     Furosemide (LASIX PO) Take 40 mg by mouth 2 times daily       simvastatin (ZOCOR) 40 MG tablet Take by mouth At Bedtime 30 tablet      garlic 150 MG TABS Take 150 mg by mouth  daily       Ascorbic Acid (VITAMIN C PO) Take 500 mg by mouth daily       METOPROLOL TARTRATE PO Take 25 mg by mouth 2 times daily       Lactulose (KRISTALOSE PO) Take 10 g by mouth 3 times daily       MAGNESIUM OXIDE PO Take 400 mg by mouth 2 times daily       UNABLE TO FIND Take 400 mg by mouth 3 times daily MEDICATION NAME: cranberry fruit for UTI prevention       ferrous sulfate (IRON) 325 (65 FE) MG tablet Take by mouth daily (with breakfast)       multivitamin, therapeutic with minerals (MULTI-VITAMIN) TABS Take 1 tablet by mouth daily       acetaminophen (TYLENOL) 500 MG tablet Take 500-1,000 mg by mouth every 4 hours as needed. Max 8 tabs per day       cholecalciferol 1000 UNITS TABS Take 1 tablet by mouth daily.           Allergies:        Allergies   Allergen Reactions     Atorvastatin Other (See Comments)     Myalgias     Cisplatin Itching     Patient notes that her hands had red lesions with swelling and itchiness within 5-10 minutes of the cisplatin starting     Levofloxacin Other (See Comments)     Confusion        Social History:     Social History   Substance Use Topics     Smoking status: Never Smoker      Smokeless tobacco: Never Used     Alcohol Use: No       History   Drug Use No         Family History:     The patient's family history is notable for:    Family History   Problem Relation Age of Onset     Breast Cancer Sister 61     BRCA negative     Last Basic Metabolic Panel:  NA      141   10/20/2016   POTASSIUM      3.5   10/20/2016  CHLORIDE      105   10/20/2016  TONY      9.0   10/20/2016  CO2       29   10/20/2016  BUN       20   10/20/2016  CR     1.25   10/20/2016  GLC      115   10/20/2016       Date Value Ref Range Status   01/16/2017 4052* 0 - 30 U/mL Final     Comment:     Assay Method:  Chemiluminescence using Siemens Centaur XP   11/14/2016 3185* 0 - 30 U/mL Final     Comment:     Assay Method:  Chemiluminescence using Siemens Centaur XP   10/20/2016 2910* 0 - 30 U/mL Final     " Comment:     Assay Method:  Chemiluminescence using Siemens Centaur XP   09/22/2016 3398* 0 - 30 U/mL Final     Comment:     Assay Method:  Chemiluminescence using Siemens Centaur XP   09/07/2016 3207* 0 - 30 U/mL Final     Comment:     Assay Method:  Chemiluminescence using Siemens Centaur XP   08/08/2016 3386* 0 - 30 U/mL Final     Comment:     Assay Method:  Chemiluminescence using Siemens Centaur XP   07/18/2016 3381* 0 - 30 U/mL Final   07/11/2016 3375* 0 - 30 U/mL Final   06/13/2016 3764* 0 - 30 U/mL Final   05/16/2016 3348* 0 - 30 U/mL Final       Physical Exam:     /61 mmHg  Pulse 74  Resp 15  Ht 1.575 m (5' 2.01\")  SpO2 99%  There is no weight on file to calculate BMI.    General Appearance: Alert, in a wheel chair, cachectic, appears much more thin.      HEENT: no thyromegaly, no palpable nodules or masses        Cardiovascular: regular rate and rhythm, no gallops, loud systolic flow murmur unchanged from previously.     Respiratory: lungs clear, no rales, rhonchi or wheezes, decreased diaphragmatic excursion    Musculoskeletal: extremities non tender and without edema    Skin: no lesions or rashes     Neurological: normal gait, no gross defects     Psychiatric: appropriate mood and affect                               Hematological: normal cervical, supraclavicular lymph nodes     Gastrointestinal:  abdomen firm, bruising present on abdominal wall from injections. Large palpable tumor mass below umbilicus.    Genitourinary: Not indicated    Performance status-  3    Assessment:    Elayne Yuong is a 66 year old woman with a history of stage IV ovarian cancer, diagnosed in 2009. Chemotherapy agents used include Carbo/taxol, Doxil, Topotecan, Gemcitabine, Cisplatin, Etoposide and weekly Taxol. She took a chemotherapy break Oct 2014. CT scan on 7/8/15 showed progression of disease, she started Letrozole and then completed 3 cycles of Carbo desensitization. CT on 11/6/15 showed interval " progression/worsening of the neoplastic process, as indicated by increase in size of right adnexal mass lesion, retroperitoneal and mediastinal lymph nodes, and peritoneal/mesenteric tumor implants. Was taken off Tesaro on 9/22/16. Recently discovered 7-8 brain metastases in December 2016. Received subsequent radiation therapy and is now recovering. Also admitted to [facility] for SBO managed conservatively. Significant weight loss.    Of a 45 minute appointment, more than 50% was spent in counseling the patient.      Plan:     1.)        Recurrent persistent disease, new brain metastases - schedule RTC in 2 months to reevaluate her health and discuss treatment/future plans. Will have MRI brain done in 3 months as patient does want follow up with this if she decides not to pursue hospice.    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 She was negative for mutations in JESUS, BARD1, BLM, BRCA1, BRCA2, BRIP1, CDH1, CHEK2, EPCAM, GPG599S, FANCC, HOXB13, MLH1, MRE1  1A, MSH2, MSH6, NBN, PALB2, PMS2, PTEN, RAD50, RAD51C, RAD51D, STK11, TP53, XRCC2.     3.) Labs and/or tests ordered include:  CMP and CBC with platelets ordered, MRI brain in 3 months. Order for IV fluids placed for weekly infusions if patient is not tolerating much po. We had lengthy discussion about this with risk of fluid retention in legs and abdomen especially if protein stores are low. Patient elicited understanding of this.      4.) Health maintenance issues addressed today include annual health maintenance and non-gyn issues with PCP. Physical therapy recommended - will start doing exercises at home and will start therapy once she feels like she is in better health. Will continue on Lovenox.     5.)  Code status was discussed, she is still full code at this point. Advance directive was also addressed, recommended they complete that as soon as possible.      6.)  Dr. Roa - will contact him at the end of the week and schedule appointment in follow up with palliative care. I have left Dr. Jason Roa a message at [phone] to call me back to discuss this patient. I have encouraged hospice care and completion of advanced directives. Mr. Young says they have started but not discussed fully their advanced directives. They are not ready for hospice care at this time. He feels he can continue to care for Elayne in their home at this point. Declined PT at home. They understand that she is too ill to receive chemotherapy at this time. I told them I would reassess her status in 2 mos however encouraged them to consider hospice care.      Thank you for allowing me to participate in the care of this patient.  If you have any questions or concerns, please do not hesitate to contact me.    Gema Modoy MD    Department of Ob/Gyn and Women's Health  Division of Gynecologic Oncology  [facility]  [phone]      CC  Patient Care Team:  Leroy Chaudhry MD, MD as PCP - General (Pediatrics)  Linda Russell, RN as Registered Nurse (Gyn-Onc)  Gema Moody MD as MD (Oncology)     I have reviewed the above note and agree with the scribe's notation as written.    I, Nahum Mayo, am serving as a scribe to document services personally performed by Gema Moody MD, based upon my observations and the provider's statements to me. All documentation has been reviewed by the aforementioned doctor prior to being entered into the official medical record.

## 2017-01-23 PROBLEM — E86.0 DEHYDRATION: Status: ACTIVE | Noted: 2017-01-01

## 2017-01-23 PROBLEM — C79.31 BRAIN METASTASIS: Status: ACTIVE | Noted: 2017-01-01

## 2017-01-23 NOTE — NURSING NOTE
"Elayne Young is a 66 year old female who presents for:  Chief Complaint   Patient presents with     Oncology Clinic Visit     2 month follow up        Initial Vitals:  /61 mmHg  Pulse 74  Resp 15  Ht 1.575 m (5' 2.01\")  SpO2 99% Estimated body mass index is 19.84 kg/(m^2) as calculated from the following:    Height as of this encounter: 1.575 m (5' 2.01\").    Weight as of 1/17/17: 49.215 kg (108 lb 8 oz).. There is no weight on file to calculate BSA. BP completed using cuff size: regular  No Pain (0) No LMP recorded. Patient is postmenopausal. Allergies and medications reviewed.     Medications: Medication refills not needed today.  Pharmacy name entered into Dasher:    [id number] - [place], [place] - [address] AT [facility] - [place], [place] - [address] [id number] - [place], [place] - [id number] [place] [address] 30    Comments:     5 minutes for nursing intake (face to face time)   Roxana Daniel LPN          "

## 2017-01-23 NOTE — NURSING NOTE
"Patient's name and  were verified.  See Doc Flowsheet - IV assess for details.  IVAD accessed with 20G 3/4\" lou gripper plus needle  blood return positive: YES  Site without redness, tenderness or swelling: YES  flushed with 30cc NS and 5cc 100u/ml heparin  Needle: deaccessed  Comments: Labs drawn.  Patient tolerated procedure without incident.    Olena Carty RN  BSN    "

## 2017-01-23 NOTE — MR AVS SNAPSHOT
After Visit Summary   1/23/2017    Elayne Young    MRN: [id number]           Patient Information     Date Of Birth          1950        Visit Information        Provider Department      1/23/2017 9:00 AM Gema Moody MD [facility]        Today's Diagnoses     Ovarian cancer, unspecified laterality (H)    -  1     Brain metastasis (H)         Hypokalemia            Follow-ups after your visit        Follow-up notes from your care team     Return in about 2 months (around 3/23/2017).      Your next 10 appointments already scheduled     Jan 23, 2017 10:30 AM   Return Visit with [facility]   [facility]    [id number] [place] [address] [phone]            Mar 27, 2017  9:00 AM   Return Visit with Gema Moody MD   [facility] ([facility]    [address] [place] [id number] [phone]              Future tests that were ordered for you today     Open Standing Orders        Priority Remaining Interval Expires Ordered    Potassium Routine 100/100 CONDITIONAL (SPECIFY)  1/23/2017          Open Future Orders        Priority Expected Expires Ordered    MR Brain w/o & w Contrast Routine  1/23/2018 1/23/2017            Who to contact     If you have questions or need follow up information about today's clinic visit or your schedule please contact [facility] directly at [phone].  Normal or non-critical lab and imaging results will be communicated to you by MyChart, letter or phone within 4 business days after the clinic has received the results. If you do not hear from us within 7 days, please contact the clinic through MyChart or phone. If you have a critical or abnormal lab result, we will notify you by phone as soon as possible.  Submit refill requests through Sprinkle or call your pharmacy and they will  "forward the refill request to us. Please allow 3 business days for your refill to be completed.          Additional Information About Your Visit        [employer] Information     Patient Communicator gives you secure access to your electronic health record. If you see a primary care provider, you can also send messages to your care team and make appointments. If you have questions, please call your primary care clinic.  If you do not have a primary care provider, please call [phone] and they will assist you.      Patient Communicator is an electronic gateway that provides easy, online access to your medical records. With Patient Communicator, you can request a clinic appointment, read your test results, renew a prescription or communicate with your care team.     To access your existing account, please contact your [facility] or call [phone] for assistance.        Care EveryWhere ID     This is your Care EveryWhere ID. This could be used by other organizations to access your [place] medical records  [id number]        Your Vitals Were     Pulse Respirations Height Pulse Oximetry          74 15 1.575 m (5' 2.01\") 99%         Blood Pressure from Last 3 Encounters:   01/23/17 111/61   01/17/17 101/64   11/14/16 90/62    Weight from Last 3 Encounters:   01/17/17 49.215 kg (108 lb 8 oz)   11/14/16 58.333 kg (128 lb 9.6 oz)   10/28/16 59.557 kg (131 lb 4.8 oz)              We Performed the Following     CBC with platelets     CMP - Comprehensive Metabolic Panel          Today's Medication Changes          These changes are accurate as of: 1/23/17 10:27 AM.  If you have any questions, ask your nurse or doctor.               These medicines have changed or have updated prescriptions.        Dose/Directions    enoxaparin 80 MG/0.8ML injection   Commonly known as:  LOVENOX   This may have changed:  how much to take   Used for:  Chronic atrial fibrillation (H), Malignant neoplasm of ovary, left (H), Malignant neoplasm of " ovary, right (H)        Dose:  70 mg   Inject 0.7 mLs (70 mg) Subcutaneous every 12 hours   Quantity:  60 Syringe   Refills:  3                Primary Care Provider Office Phone # Fax #    Leroy Chaudhry MD, [phone]       [address] [address] [place] [address]        Thank you!     Thank you for choosing [facility]  for your care. Our goal is always to provide you with excellent care. Hearing back from our patients is one way we can continue to improve our services. Please take a few minutes to complete the written survey that you may receive in the mail after your visit with us. Thank you!             Your Updated Medication List - Protect others around you: Learn how to safely use, store and throw away your medicines at www.disposemymeds.org.          This list is accurate as of: 1/23/17 10:27 AM.  Always use your most recent med list.                   Brand Name Dispense Instructions for use    acetaminophen 500 MG tablet    TYLENOL     Take 500-1,000 mg by mouth every 4 hours as needed. Max 8 tabs per day       cholecalciferol 1000 UNITS Tabs      Take 1 tablet by mouth daily.       cyclobenzaprine 10 MG tablet    FLEXERIL    20 tablet    Take 1 tablet (10 mg) by mouth 2 times daily as needed       dexamethasone 4 MG tablet    DECADRON         enoxaparin 80 MG/0.8ML injection    LOVENOX    60 Syringe    Inject 0.7 mLs (70 mg) Subcutaneous every 12 hours       ferrous sulfate 325 (65 FE) MG tablet    IRON     Take by mouth daily (with breakfast)       garlic 150 MG Tabs tablet      Take 150 mg by mouth daily       GENERLAC 10 GM/15ML solution   Generic drug:  lactulose          hyoscyamine 0.125 MG sublingual tablet    LEVSIN/SL     Place 0.125 mg under the tongue       KRISTALOSE PO      Take 10 g by mouth 3 times daily       LASIX PO      Take 40 mg by mouth 2 times daily       LORazepam 0.5 MG tablet    ATIVAN    20 tablet    Take 1  tablet (0.5 mg) by mouth every 6 hours as needed for anxiety or other (nausea/vomiting, sleep)       MAGNESIUM OXIDE PO      Take 400 mg by mouth 2 times daily       METOPROLOL TARTRATE PO      Take 25 mg by mouth 2 times daily       Multi-vitamin Tabs tablet      Take 1 tablet by mouth daily       pantoprazole 40 MG EC tablet    PROTONIX    30 tablet    Take 1 tablet (40 mg) by mouth daily Take 30-60 minutes before a meal.       potassium chloride 10 MEQ tablet    K-TAB,KLOR-CON    30 tablet    Take 2 tablets (20 mEq) by mouth 2 times daily       potassium chloride SA 10 MEQ CR tablet    K-DUR/KLOR-CON M         prochlorperazine 10 MG tablet    COMPAZINE    30 tablet    Take 0.5 tablets (5 mg) by mouth every 6 hours as needed for nausea or vomiting       sertraline 50 MG tablet    ZOLOFT         simvastatin 40 MG tablet    ZOCOR    30 tablet    Take by mouth At Bedtime       trametinib 2 MG tablet    MEKINIST    30 tablet    Take 1 tablet (2 mg) by mouth daily Take 1 hr before or 2 hrs after a meal. STORE and DISPENSE from original container.       * UNABLE TO FIND      Take 400 mg by mouth 3 times daily MEDICATION NAME: cranberry fruit for UTI prevention       * UNABLE TO FIND      zypresa       VITAMIN C PO      Take 500 mg by mouth daily       * Notice:  This list has 2 medication(s) that are the same as other medications prescribed for you. Read the directions carefully, and ask your doctor or other care provider to review them with you.

## 2017-01-23 NOTE — NURSING NOTE
Order for MRI Brain w/o & with contrast faxed to [facility] Sched[facility] at fax number [phone].    Bruce Castillo RN, BSN, OCN

## 2017-01-26 NOTE — PROGRESS NOTES
Infusion Nursing Note:  Elayne Young presents today for IVF/Kcl.    Patient seen by provider today: Yes: MD   present during visit today: Not Applicable.    Note: Kcl infused slower due to patient low wt per pharmacy      Intravenous Access:  Implanted Port.    Treatment Conditions:  Not Applicable.      Post Infusion Assessment:  Patient tolerated infusion without incident.    Discharge Plan:   RTC.    IRISH CARRERA RN

## 2017-01-30 NOTE — TELEPHONE ENCOUNTER
, Alejandro, reports that patient is eating and drinking adequate amounts. Her urine is clear and light in color. He does not think that she needs labs or IVF infusion today.    Appointments scheduled on 2/6/17 for lab recheck (cbc & bmp) with IVF & potassium replacement. These appointments were read back by Alejandro to this nurse. They also have MyChart for reference. They know to call if any needs arise prior to this visit.    Danii Stuart RN

## 2017-02-01 NOTE — TELEPHONE ENCOUNTER
Refill of Ativan 0.5 mg tablets (quantity #30 tablets with 1 refill) called in to [id number] in [place], [place] at [phone] as ordered by Dr. Moody.  See medication list for further prescription details.  Compazine refill has been e-prescribed to above pharmacy as well.    Bruce Castillo, RN, BSN, OCN

## 2017-02-08 NOTE — PROGRESS NOTES
Quick Note:    Reviewd HGB is up Plts are low will send to pt. And her .  Linda HAIRSTON RN, OCN  Care Coordinator   Gynecologic Cancer   Office [phone]  Pager [phone] #[phone]  ______

## 2017-02-20 NOTE — TELEPHONE ENCOUNTER
Patient called into clinic with a message for Dr. Moody.  Patient states that her PCP has her taking dexamethasone to help with inflammation and symptom control, and he would like to start her on every other day Bactrim for infection prophylaxis.  Patient states that she strongly prefers to run this by Dr. Moody prior to starting the Bactrim, to make sure that there are no contraindications to this.  Dr. Moody was notified, and is agreeable with patient taking the every other day Bactrim.  Patient was notified via telephone.    Bruce Castillo RN, BSN, OCN

## 2017-03-06 NOTE — PROGRESS NOTES
Form Request Documentation    Date Received in Clinic:  03/03/17  Mode Received:  Patient Drop Off  Name/Type of Form: Application for Disability Parking Certificate - [place] DPS  Date Completed: 03/06/17  Date patient notified: 03/06/17 via telephone  Disposition of Form:  Patient to  completed form at  of clinic    Bruce Castillo RN, BSN, OCN

## 2017-03-16 NOTE — TELEPHONE ENCOUNTER
TC from doctor at [facility] ER- pt in and feeling though the masses to the abdomen and pelvis are firmer and more prominent- no problems with bowel as was found to have small bowel obstruction last month. Pt has also noted weight loss. It was reported to have CT on 2/14 with SBO but will get new CT for review with changes. Suggest she have testing but get this pushed into our system and pt to have April visit moved up with review of testing. Aren is notified and will move up appt. Review of chart showed pt is appropriate for hospice so if no urgent findings on CT other than progression of cancer- she may be referred over to Hospice if pt wishes. SGermscheid,CNP

## 2017-03-20 NOTE — PROGRESS NOTES
"RN spoke with patient's spouse, Alejandro, via telephone this morning to get update on patient.  Noted patient was in the ER last week on 3/16 with complaints of new masses/lumps in her abdomen.  Patient is planning to have a CT this week to evaluate her abdomen, as she did not want to wait in the ER to have the CT scan done at that time.  Patient is scheduled to see Dr. Moody in clinic next Monday 3/27/17.     Spouse states that patient is doing about the same this week.  She continues to have a difficult time eating, mostly due to the fact that she has a \"terrible taste\" in her mouth.  Per spouse, they have been working with patient's PCP to wean her off of her Decadron and Sulfa, as they feel that the Sulfa is causing this altered taste.  Spouse states that patient is currently doing a slow wean off of the Decadron, and is expected to be completely done with the Decadron the first week in April.  She has stopped taking the Sulfa already.  Spouse states that patient seems to be drinking adequately, and he does not feel that she needs to come into clinic for labs/IVF/electrolyte replacement at this time.  He is encouraged to call RN if he changes his mind.  Will watch for CT results to arrive prior to upcoming visit with Dr. Moody.    Bruce Castillo, RN, BSN, OCN  Care Coordinator  [facility]  [phone]    "

## 2017-03-27 PROBLEM — Z74.09 IMMOBILITY: Status: ACTIVE | Noted: 2017-01-01

## 2017-03-27 PROBLEM — R63.4 WEIGHT LOSS: Status: ACTIVE | Noted: 2017-01-01

## 2017-03-27 NOTE — PROGRESS NOTES
Care Coordinator Note  PT seen and offered support and counseling  Order for Hospice referral  sent to [facility] and they received the order and set up appt for this week. Pt sees Dr Roa in Palliative Care at [facility].     Phone [phone]  Fax [phone]        Pt  And  verbalized back understanding of the above information discussed.   Linda HAIRSTON, RN, OCN  Care Coordinator   Gynecologic Cancer   Office [phone]  Pager [phone]

## 2017-03-27 NOTE — PROGRESS NOTES
Follow Up Notes on Referred Patient    Date: 2017      RE: lEayne Young  : 1950  HAILE: 2017      Elayne Young is a 66 year old woman with a history of stage IV ovarian cancer, diagnosed in . Chemotherapy agents used include Carbo/taxol, Doxil, Topotecan, Gemcitabine, Cisplatin, Etoposide and weekly Taxol. She took a chemotherapy break Oct 2014. CT scan on 7/8/15 showed progression of disease, she started Letrozole and then completed 3 cycles of Carbo desensitization. CT on 11/6/15 showed interval progression/worsening of the neoplastic process, as indicated by increase in size of right adnexal mass lesion, retroperitoneal and mediastinal lymph nodes, and peritoneal/mesenteric tumor implants and her  was elevated to 2064. Most recently was on niraparib Tesaro trial but came off due to weight loss and feeling poorly. Wanted a month break.  Wanted to do NCI Match trial but history of severe aortic stenosis and would therefore not qualify based on her cardiac status. Her last ECHO was performed in 3/2016-had a repeat ECHO to evaluate her cardiac status to see if this has improved enough for MATCH trial.      16- ECHO:  Global and regional left ventricular function is normal with an EF of 60-65%.  Left ventricular size is normal. Moderate concentric wall thickening  consistent with left ventricular hypertrophy is present. Grade II or moderate  diastolic dysfunction.  The aortic valve is heavily calcified. There is severe stenosis (Pk velocity  5.88m/s; Mn grad 74mmHg; MYRANDA 0.40cm2) and mild insufficiency.    16 - 12/10/16: She ended up having a balloon valvuloplasty on 16 with Dr. Talib Wyatt [facility].  Admitted to hospital for slurred speech - MRI showed small stroke and brain metastases. Final Diagnosis:  1. Symptomatic, very severe aortic stenosis with mean gradient approximately 80 mmHg across the aortic valve  status post successful balloon aortic valvuloplasty December 9, 2016. Reduction in gradient to 51 mmHg on transthoracic echocardiogram December 10, 2016.  2. Transient ischemic attack, following balloon aortic valvuloplasty , resolved.  3. Metastatic ovarian cancer status post recent completion of brain radiation.  4. Paroxysmal atrial fibrillation.  5. Hypertension.  6. History of DVT on anticoagulation.    12/10/16: MRI brain: Multiple enhancing intra-axial lesions are seen. An approximately 5.6 x 4.6 mm mass medial left cerebellum and vermis, 5.4 x 4.5 mm enhancing mass right temporal lobe, 4.8 x 4.3 mm enhancing mass right posterior temporal, 3.6 x 3 mm mass medial right basal ganglia, 1.3 x 1.2 cm mass left frontal lobe, 2.7 x 2.7 mm mass medial right frontal lobe, 1.2 x 1.1 cm mass anterior right frontal lobe and possible 3 mm mass within the medial right occipital lobe.. Focus of diffusion hyperintensity with restriction seen within the corona radiata on the left measuring approximately 7 mm in size with a punctate focus of diffusion hyperintensity measuring approximately 2 to 3 mm in size involving the cortex of the left frontal lobe without definite enhancement most worrisome for superimposed small acute to subacute infarcts. Moderate degree of perilesional edema is seen associated with the 2 largest masses which are seen within the frontal lobes. The dominant mass within the right frontal lobe corresponds to the small area of increased density on the CT scan of the head which may be related to hemorrhage, blood and/or mucin. Mild to minimal fransisco-lesional edema associated with the additional masses. Superimposed multiple nonspecific foci of T2 and FLAIR hyperintensity seen involving the brain parenchyma. There is underlying mild volume loss. Normal flow-voids are seen within the major arterial and venous structures. Probable multiple small mucous retention cysts within the floor of the left maxillary sinus.  Mild mucosal membrane thickening throughout the remainder of the paranasal sinuses. The mastoid air cells appear unopacified.      12/17/16: admitted to ED again for bowel obstruction - abdominal pain and vomiting.     12/27/16: visited radiation oncology at [facility] -       TODAY:  She reports to clinic today with her . Ca125 as of 1/16/17 was 4052. She reports that on 12/10/16 she was admitted to the hospital due to slurred speech. MRI was done and she was told that she had a small stroke. There were also multiple (about 7-8) brain metastases noted at that time. Then on 12/17/16, she returned to the ED for severe abdominal pain and vomiting. She was found to have a bowel obstruction however surgery could not be done. On 12/27/16 she visited [facility] Rad Onc clinic to discuss recent events. Today she reports to clinic with her  and has been feeling better in the last week. 2 weeks ago she was vomiting at least once a day, sometimes more. This was post-radiation therapy. She reports today that the last week has been a great improvement, vomiting much less and she has been able to eat more. She has lost a significant amount of weight however, around 20-22 lbs since she last visited the clinic. Regarding her valvuloplasty and hospital visit, no g tube was placed. She is having normal bowel movements and no urinary problems. Future treatment and plans were discussed. She and her  are concerned about her health and her plans as of now are to work on getting stronger and improving her health. She has been eating and drinking more, however she has been dehydrated for the past few weeks. She spends most of her day in bed - she gets up at around 10:30am and walks downstairs, she then usually takes a nap and is in bed for 12 hours or more during the day. She is able to walk around her house without using a wheelchair and she feel comfortable at home. They are looking into visiting a nutritionist and  they recently visited palliative care who greatly improved her nausea/vomiting. I recommended they visit a physical therapist to improve her strength and mobility. Elayne would like to reevaluate her health in 1-2 months and potentially start treatment again, depending on her well being.       History:  She was diagnosed in 2009 with stage IV carcinoma of the ovary vs peritoneum after a + biopsy of a left supraclavicular node (?--patient does not think this occurred). Due to this + SC LN and extensive intraperitoneal disease she was recommended to undergo neoadjuvant chemotherapy with plan for debulking if there was response to chemotherapy. She then underwent treatment with 6 cycles of carbo and taxol without adequate response with persistence of disease noted on CT scan. Her initial  was 559. In 1/2010 she then started Doxil and had 5 cycles, then (6/2010) started Topotecan weekly for 16 cycles. 8/2011 CT scans again indicate persistence of disease without change from previous scans. She then had 11 cycles of Gemcitabine (9/2011) that was completed in 6/2012. 7/2012 she started 5 cycles Cisplatin which was discontinued after severe reaction during the 5th cycle. Her  was noted to be increasing, with a stable CT scan.     7/6/2009: Date Received: [id number]  DIAGNOSIS:  Uterine cervix, biopsy:  1. Multiple fragments of benign polypoid cervical mucosa with Nabothian cyst formation.  2. No evidence of dysplasia or neoplasm.  7/6/2009: DIAGNOSIS: Left supraclavicular lymph node, percutaneous fine needle aspiration: POSITIVE for malignant cells (adenocarcinoma).  SITE: Left supraclavicular node fine needle aspiration  GROSS DESCRIPTION: The specimen designated left supraclavicular node fine needle aspiration consists of 4 smears, which are submitted for cytologic study.  SGL/amf  MICROSCOPIC DESCRIPTION: The smears contain moderate numbers of aggregates of cytologically malignant cells with morphologic  features of adenocarcinoma cells, which are predominantly present in large and small papillary and 3-dimensional aggregates. Some histiocyte-like cells and modest numbers of red cells are also included. Among the cytologically malignant cells, occasional cells are identified which contain central cytoplasmic vacuoles, with eccentric placement of nuclei, focally suggestive of signet type cells. However, the findings are not diagnostic of signet cell carcinoma in this material. No areas of unequivocal psammomatous calcification are seen.  No normal range   : 559, 506, 707, 729, 1136, 1094, 1264, 1215, 1270, 1291, 1395  1/2010: Doxil 40 mg/m2 q 28 days. CT after 2 cycles showed stable disease. Received 5 cycles. 2nd opinion at [facility] recommended single agnet palliative chemo as well. May benefit from weakly.  6/2010: Weekly Topotecan at 4 mg/m2 at 1, 8 and 15 q28 days/ Second cycle decreased to 3.2 mg/m2 due to cytopenia. Continued to 16 cycles and Aug 2011 scan showed progression  9/2011: Gemcitabine at 800 mg/m2 day 1 and 15 for 28 days. Cycle two at 700 mg/m2 day 1 and 8 every 21 days. Neutrophils were low at day 8, so she was tx at day 15 instead.   Completed 11 cycles.  5/14/12: DIAGNOSIS:  Ascites fluid (see S-): Positive for carcinoma (see comment).  COMMENT: An immunoperoxidase panel is attempted on the cell block material. The neoplastic clusters are negative for CK5/6, CK20 and calretinin. They are positive for CK7. 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 A primary peritoneal carcinoma cannot be definitely excluded. Colleague CHRISTIANO has reviewed selected slides and concurs.   Diagnosis previously reported on 7/16/2009. Additional diagnostic information added to the end of the report. The original evaluation and diagnosis was performed by Dr. Issa Chen.  HORMONE RECEPTOR ANALYSIS BY IHC  ESTROGEN RECEPTOR INTERPRETATION: POSITIVE  Percent staining:  10-20% Intensity: Weak  Grading Criteria: Semi-quantitative  Positive > 9%; Low Positive 1-9%; Negative < 1%  Internal and external controls: Adequate  Standard assay conditions: Met  This is a cytology cell block preparation; standard processing was not possible and the results may be affected.  7/2012: Distal DVT seen rt leg, started on Warfarin.  8/2012: Cisplatin 20 mg/m2 weekly for 3 weeks q28 days.  11/2012 she started Etoposide for which she has completed 6 cycles with min SE most of which involve fatigue, anemia, and SOB  Receives paracentesis about every 6 to 5 weeks for ascites accumulation.  Recent CT 3/13/2013  scan reveals peritoneal carcinomatosis, with large complex cystic pelvic mass that is grossly unchanged from previously films. There was new note of mild dilation of bowel loops.  4/5/13: IR paracentesis. US demonstrates non loculated ascites.   Second opinion with Dr. Martinez recommended cytology from paracentesis for chemosensitivity testing and weekly taxol for 6 weeks.   Sensitivity testing sent and showed resistance to all drugs tested. Most already tried.  7/1/13: CT C/A/P Impression:  1. Interval decrease ascites  2. Slight increase of 4.2 x 2.5 cm cystic mass at dome of liver and mild right hydronephrosis.   3. Otherwise stable appearance of large ovarian masses and metastatic disease.  Started on weekly Taxol: 6 weeks in a row for 7 weeks. 50 mg/m2  10/21/13: CT C/A/P Impression:  1. Findings suspicious for degree of partial obstruction in distal small bowel. The point of transition is not readisly identified. Thickening portions of distal small bowel due to other eitiology such as nonspecific enteritis is felt less likely.  2. In abdomen/pelvis, cystic masses coarsely calcified implants overall have not appreciable changed.  3. In chest, there is little change. Areas of thoracic lymphadenopathy,some of which is calcified is largely unchanged, but subcarinal lymphadenopathy appears to have  slightly decreased in size.  By Jan 2014, had completed 10 cycles Taxol  1/27/14: CT C/A/P impression:  1. Moderate amount ascites in abdomen/and pelvis is increased.  2. Cystic pelvis masses and numerous coarsely calcified implants in abdomen/pelvis not isgnificantly changed.  3. Right double-J ureteral stent in place without evidence of hydronephrosis.  4. Stable appearance of chest.  5. Mildly dilated small bowel loops have resolved.  5/5/14: CT C/A/P Impression:  1. Moderate to large amount intra-abdominal ascites appear similar.  2. Cystic pelvic mass and numerous coarsely calcified implants in abdomen and pelvis are similar.  3. Subcapsular fluid collection adjacent to liver again see.One area may be new and one collection inferiorly to rt lobe may be increased. Largest collection superiorly is stable.  4. Prominent dilation of right renal collecting system. Question right ureteral stent dysfunction. Suggest urology eval.  5. 6 mm nodular area of right lung base medially. Could be scarring. Follow up.  6. Remainder appears stable.  5/19/14: Cystoscopy and right stent replacement  8/11/14: CT C/A/P Impression:  1. No significant change compared with 5/5/14. No CT evidence of progressive metastatic disease. Prior treated and densely calcified mediastinal lymph nodes diffuse and densely calcified peritoneal implants stable. Partially loculated ascites in slightly decreased compared with May. Dominant multiloculated cystic pelvic mass without change.  2. Improved right hydronephrosis compared to prior. Mild fullness of rt collecting system remains with ureteral stent in good position.  9/30/14:  295  10/21/14:  320  8/11/14: CT C/A/P Impression:  1. New somewhat linear 7 mm density within lingula may be on basis of atelectasis. Continued follow up needed. Unchanged 6 mm density in right lung base.  2. Moderate marked hydronephrosis on the rt has increased slightly without double-j ureteral stent in  place.  3. No significant change in large multiloculated cystic and calcified pelvic masses.   4. Multiple calcified peritoneal implants unchanged  5. No significant change in partially loculated diffuse low-density abdominal fluid.  Completed 10 cycles weekly taxol. Go forward with observation for 3 months and then repeat scan  12/4/14: CT C/A/P Impression:  1. Moderate bilateral pleural effusions with adjacent atelectasis.  2. Moderate ascites somewhat decrease superiorly and increased inferiorly compared to prior CT> Portions of this loculated including a possible subcapsular portion adjacent to liver. Multiple calcifications present.  3. No change in partially calcified multiloculated cystic mass in pelvis, presumably ovarian malignancy.  4. Right ureteral stent in place with mild prominence of collecting system in rt kidney.  12/18/15: CT C/A/P Impression:  1. Considerable interval increase ascites with large loculated appearing components particularly marked in anterior pelvis with right flank.  2. Mildly dilated proximal to mid small bowel loops with scattered air-fluid levels suggesting partial obstruction. Proximal small bowel loops were also somewhat prominent previously although mid small bowel loops slightly more prominent on current study.  3. No change in partly calcified 7 x 14 cm pelvic mass consistent with ovarian malignancy. Scattered calcified tumor implants unchanged.  4. Moderate bilateral pleural effusions. Unchanged on right but increased on left. There is adjacent atelectasis.  5. Right ureteral stent. Collecting system of left kidney more prominent than 12/4/14.  3/2/15:  315  3/17/15: CT C/A/P: IMPRESSION:  1. No evidence of progression of metastatic ovarian cancer. Stable partially calcified cystic/solid right adnexal mass, soft tissue density and calcified peritoneal implants and calcified mediastinal and abdominal/pelvic lymph nodes.   2. Although the amount of loculated ascites is  markedly reduced since 12/18/2014, there are new discrete rim-enhancing air-fluid collections involving the right paracolic gutter and anterior pelvis, which are concerning for abscesses.  3. Decreased dilated loops of small bowel since 12/18/2014. Matted loops of small bowel in the anterior pelvis may predisposes patient to future small bowel obstruction.  4. No evidence of hydronephrosis with right nephroureteral stent in place.  5. Resolution of bilateral pleural effusions.    7/8/15: CT C/A/P: IMPRESSION: In this patient with a history of ovarian cancer:  1. Progression of disease with multiple enlarging implants as detailed above. Minimal enlargement of the primary mixed solid cystic and calcified mass arising from the right adnexa.  2. Complex rim-enhancing fluid collection in the midpelvis has slightly decreased in size.  3. No ascites.     7/13/15: Treatment planning with Dr. Moody.She has been feeling well off-chemotherapy. Her appetite is good. No nausea or vomiting. No issues with bowels or bladder. Denies any vaginal or rectal bleeding. She did have a mild upper respiratory infection and was placed on antibiotics. She is still having numbness/tingling in her fingers. She otherwise has been doing very well. She is still scheduled to take an 8 day cruise through [place] 8/2015 and has her son's wedding 9/2015.     Plan: Start Letrozole 2.5 mg daily.     8/10/15:  1792.  8/26/15:  2102.    Plan: Given  increased to 2102; discussed with Dr. Moody who recommends starting Carbo desensitization with Neulasta.    9/2/15: Cycle #1 inpatient Carbo desensitization.   9/30/15: Cycle #2 inpatient Carbo desensitization.  1900. She has afib and a hypotensive episode; evaluated by Cards with TTE (normal LV function, EF 60-65; severe aortic stenosis). Started on Warfarin.  10/21/15: Cycle #3 inpatient Carbo desensitization.  2268.    11/6/15:  2064. CT c/a/p IMPRESSION:  1. In this  "patient with metastatic ovarian cancer, overall findings are indicative of interval progression/worsening of the neoplastic process, as indicated by increase in size of right adnexal mass lesion, retroperitoneal and mediastinal lymph nodes, and peritoneal/mesenteric tumor implants.  2. A few scattered pulmonary nodules appearing more prominent (compared to 7/8/2015). Consider reevaluation on followup imaging.  3. Mild compression fractures of the vertebral bodies of T12, L1, and L2, new since 7/8/2015.  4. Calcification of the aortic valve leaflets with hypertrophic appearance of the left ventricle, consistent with history of aortic valvular stenosis.     11/9/15: Treatment planning.Recommend she continue with chemotherapy on carboplatin AUC 5-dose decreased. Patient would like to reduce the carboplatin dosage. Also recommend she stop coumadin and start Lovenox due to fear of erratic INR levels . We will consider avastin in the future. Guardant 360 test was drawn today.    11/20/15: Cycle #4 inpatient Carbo desensitization.    12/2/15: Cycle #5 inpatient Carbo desensitization planned for 12/9.  2053.    12/15/15:  2301    1/11/16:  3025. Treatment planning. \"For now patient can either choose to take a break from chemo or continue with therapy until NCI Match trial opens again. Patient wants to proceed with Doxil (has not seen this since 2010) until we are able to get the patient on the NCI match trial.\"    1/14/16: MUGA EF 69.3%  1/15/16: Cycle #1 Doxil.    2/10/16: Cycle #2 Doxil.   3085.  3/9/16: Cycle #3 Doxil planned for 3/11.  3710.    4/4/16: CT cap         Subcentimeter hypodensities and irregular enhancement of the thyroid is unchanged. Main pulmonary artery is mildly dilated. Coronary artery calcifications. Calcific atherosclerosis of the aortic arch. No significant change in size of periaortic lymph node measuring approximately 8 mm (series 2, image 33) and 6 mm retrocrural " lymph node (series 2 image 46), stable.     Basilar atelectasis, not significantly changed. 3.5 mm noncalcified pulmonary nodule of the left lower lobe (series 6, image 76), appears more prominent than 11/6/2015 when measured 3 mm.     Abdomen and Pelvis:          15.8 x 9.4 cm cystic solid mass of the right adnexa previously measuring 14.6 x 8.4 cm when measured in similar transaxial dimensions. The mass lesion abuts the urinary bladder and displaces  multiple bowel loops.     Interval increase in size of multiple retroperitoneal and mesenteric soft tissue implants again appreciated including a 4.3 x 5.2 cm cystic solid mass at the gastrohepatic ligament, previously 2.9 x 3.8 cm. 2.3 cm right abdominal wall focus (series 2, image 65), stable. Slight  increase in numerous centrally hypodense enhancing retroperitoneal lymph nodes including a 19 mm left para-aortic lymph node, previously 16 mm. Lymph nodes encase but do not obstruct the renal arteries bilaterally. Prominent left inguinal lymph node measuring 12 mm, stable. Slight decrease in centrally hypodense lenticular focus overlying the anterior surface of the right hepatic lobe measuring approximately 8 mm in thickness (series 2 image 55), previously 16 mm.    Right double-J ureteral stent is stable in position. The hepatic parenchyma, spleen, adrenal glands, and pancreas are unremarkable. No free fluid or free air within the abdomen or pelvis.     Bones and soft tissues: Unchanged enhancing soft tissue deposits of the abdominal musculature including a right abdominal wall lesion measuring 17 x 26 mm (series 2, image 76), stable. Stable compression deformities of T12-L2.           Impression: In this patient with history of recurrent ovarian cancer:  1. Increase in size of primary cystic/solid right adnexal mass when compared to 11/6/2015.  2. Increase in size of numerous soft tissue deposits, most conspicuous in the upper abdomen near the celiac artery.  3.  Increase in size of numerous retroperitoneal lymph nodes which appear to encase but do not obstruct the renal arteries.    4/11/16:   3887.  4/21/16: abdominal mass biopsy with serous carcinoma  5/2/16:   3454. CT cap Impression:    This patient with history of ovarian cancer:  1.  No significant change in large pelvic mass since 4/4/2016.  2.  Stable to slightly decreased adenopathy in the abdomen as described since 4/4/2016.  3.  No significant change in enlarged subcarinal lymph node and adrenal gland thickening since 4/4/2016.  4.  No significant change in 4 mm left lower lobe pulmonary nodule since at least 11/06/2015.  5.  Tethering of some small bowel loops in the left lower quadrant caused by a metastatic deposit, with borderline dilation and fecalization of small bowel proximal to the site of tethering.  While not currently severely obstructed, site is susceptible to obstruction given tethering and slow transit.    5/16/16: Cycle #1 Niraparib.   3348.  6/1/16: Cycle #1 D 15 delayed secondary to thrombocytopenia.    6/6/16: She is here today for visit prior to D22 Cycle #1 Niraparib-holding this week due to thrombocytopenia.  6/13/16: Cycle #2 Niraparib.  3764.    6/30/16: Stent changed.  7/11/16:  3375  7/13/16 CT CAP Impression:    In this patient with history of ovarian cancer:  1.  Slightly increased fluid about the anterior margin of the liver and near the caudate lobe.  This is concerning for progression of disease.  Attention on followup imaging is recommended.  2.  No significant change in large pelvic mass since 5/2/2016 or 04/4/2016.  3.  Stable adenopathy in the abdomen.  Stable soft tissue masses in the abdominal wall and along the greater curvature of the stomach.  4.  Enlarged subcarinal lymph node is not significantly changed to slightly enlarged since 5/2/2016.  5.  Pulmonary nodule in the left lower lobe is not significantly changed since at least 11/6/2015.  No  new pulmonary nodule.  6.  Persistent tethering of some small bowel loops in the left lower quadrant, with resolved fecalization and dilation of the bowel adjacent to this since 5/2/2016.    7/18/16: she comes to clinic feeling fatigued, but doing better, went for dinner out last night and had soup. No emesis. Wants to proceed with Niraparib at 100 mg dose. Will take before bed with compazine.  Plan Niraparib at 100 mg/day today due to thrombocytopenia has been dose reduced to 100, had held for plts to 130 from 75 now; will recheck CBC and BMP weekly.Brittani, , saw Elayne today CT CAP is stable disease. If patient unable to continue 100 mg/day plan to change to  NCI match trial. Will take compazine 30 min before drug at night before bed.     8/8/16: Cycle #4 Niraparib.    3386.    9/6/16: CT cap IMPRESSION:  In this patient with a history of ovarian cancer:    1. Trace interval increase in size of multiple small solid pulmonary nodules, continued attention on follow-up. Right subcarinal lymph node is larger than on 7/8/2015, smaller than on 3/17/2015.  2. Improvement in the multiple subcutaneous soft tissue nodules of the abdomen. Soft tissue nodules within the abdominal wall musculature are stable from 7/13/2016.  3. Loculated fluid anterior to the liver slightly decreased from 7/13/2016.  4. No significant change in large multicystic pelvic mass from 4/4/2016.  5. Stable adenopathy of the abdomen including the celiac axis and retroperitoneum.  6. Tethering of the small bowel loops in the left lower quadrant with significant dilatation.    9/7/16: Cycle #5 Niraparib.  3207    9/22/16:Tesaro trial but no appetite and wanted to go off the trial - Patient taken off Tesaro Trial.    10/2016:  has reduced to 2910     1/16/2017:  - 4052    3/21/17: MRI brain   IMPRESSION:    1. Multiple intracerebral metastases are again seen and given difference in technique appear unchanged in  "size and number from the prior MRI. Associated the edema is unchanged. Overall, no definite interval change.  FINDINGS: No evidence of restricted diffusion to suggest acute infarct. Again seen are multiple cerebral and cerebellar intracranial metastases do not appear significantly changed in size or number from the prior exam. Largest lesion is noted in the left frontal lobe measuring about 1.1 x 1 cm. Associated edema is unchanged.    3/22/17: CT CAP   IMPRESSION:    1. Increased size to a subcutaneous mass in the right abdominal wall since previous study.  2. Extensive calcified and cystic neoplasm elsewhere essentially unchanged  3. Ascites unchanged  4. Increased bilateral pleural effusions right greater than left  5. Moderate mid small bowel obstruction persists  6. Right ureteral stent  7. Diffuse anasarca and evidence of cachexia     Today: Elayne presents to clinic with her  and she does not look to be in good health. She has lost a significant amount of weight. She explains that she gags at the sight of any food and she is unable to eat. She experienced some severe vomiting a few weeks ago and was given decadron for nausea which helped significantly. She has also been experiencing wheezing associated with her asthma. She has started using her nebulizer twice daily, once at night and once in the morning. She was taken off of sulfa and was prescribed Dapsone by Dr. Chaudhry. Her nausea and asthma have improved however she is unable to overcome the \"bad taste\" in her mouth which is preventing her from eating. Her  understands that due to her health this is not the time to continue with additional treatment. At this time, he would like to focus on increased involvement with palliative care to make Elayne comfortable and to help her gain weight and improve her health as much as possible. Subcutaneous mass in right abdominal wall revealed in CT is palpable. Brain metastases is stable. She continues to " "have daily bowel movements. She is not passing much gas but she can hear her stomach \"gurgling\" and she keeps her stools soft.     Review of Systems:    Systemic           + significant weight loss; no fever; no chills; no night sweats; + loss of appetite   Skin           no rashes, or lesions  Eye           no irritation; no changes in vision  Sancho-Laryngeal           no dysphagia; no hoarseness   Pulmonary    no cough; no shortness of breath  Cardiovascular    no chest pain; no palpitations  Gastrointestinal    no diarrhea; no constipation; intermittent abdominal pain; no changes in bowel habits; no blood in stool; poor appetite  Genitourinary   no urinary frequency; no urinary urgency; no dysuria; no pain; no abnormal vaginal discharge; no abnormal vaginal bleeding  Breast    no breast discharge; no breast changes; no breast pain  Musculoskeletal    no myalgias; no arthralgias; no back pain  Psychiatric           no depressed mood; no anxiety    Hematologic               no tender lymph nodes; no noticeable swellings or lumps   Endocrine    no hot flashes; no heat/cold intolerance         Neurological   no tremor; no numbness and tingling; no headaches; no difficulty sleeping      Past Medical History:    Past Medical History:   Diagnosis Date     Acute on chronic diastolic heart failure (H) 10/1/2015     Anemia     Chemotherapy related     BP (high blood pressure) 4/30/2013     Depression 4/30/2013     Fatigue      Incontinence of urine      Lipids blood increased 4/30/2013     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    Stage IV primary peritoneal cancer diagnosed in 2009,      Personal history of DVT (deep vein thrombosis) 4/30/2013 2012, right lower extremity      TIA (transient ischaemic attack) 4/30/2013         Past Surgical History:    Past Surgical History:   Procedure Laterality Date     ABDOMINAL PARACENTESIS  2009    Multiple proceedures     schwannoma  2013    Excision from left hand         Health " Maintenance Due   Topic Date Due     HF ACTION PLAN Q3 YR (NO INBASKET MSG)  1950     HETAL QUESTIONNAIRE 1 YEAR  1950     PHQ-9 Q1YR (NO INBASKET)  1950     LIPID MONITORING Q1 YEAR( NO INBASKET)  10/27/1950     TETANUS IMMUNIZATION (SYSTEM ASSIGNED)  10/27/1967     ADVANCE DIRECTIVE PLANNING Q5 YRS (NO INBASKET)  10/27/1967     HEPATITIS C SCREENING  10/27/1967     MAMMO SCREEN Q2 YR (SYSTEM ASSIGNED)  10/27/1989     COLON CANCER SCREEN (SYSTEM ASSIGNED)  10/27/1999     FALL RISK ASSESSMENT  10/27/2014     DEXA SCAN SCREENING (SYSTEM ASSIGNED)  10/27/2014     PNEUMOCOCCAL (1 of 2 - PCV13) 10/27/2014       Current Medications:     Current Outpatient Prescriptions   Medication Sig Dispense Refill     UNABLE TO FIND zypmalick       prochlorperazine (COMPAZINE) 10 MG tablet Take 0.5 tablets (5 mg) by mouth every 6 hours as needed for nausea or vomiting 30 tablet 0     LORazepam (ATIVAN) 0.5 MG tablet Take 1 tablet (0.5 mg) by mouth every 6 hours as needed for anxiety or other (nausea/vomiting, sleep) 30 tablet 1     hyoscyamine (LEVSIN/SL) 0.125 MG sublingual tablet Place 0.125 mg under the tongue       trametinib (MEKINIST) 2 MG tablet Take 1 tablet (2 mg) by mouth daily Take 1 hr before or 2 hrs after a meal. STORE and DISPENSE from original container. 30 tablet 0     pantoprazole (PROTONIX) 40 MG enteric coated tablet Take 1 tablet (40 mg) by mouth daily Take 30-60 minutes before a meal. 30 tablet 1     dexamethasone (DECADRON) 4 MG tablet        GENERLAC 10 GM/15ML solution        potassium chloride SA (K-DUR,KLOR-CON M) 10 MEQ tablet        sertraline (ZOLOFT) 50 MG tablet        potassium chloride (K-DUR) 10 MEQ tablet Take 2 tablets (20 mEq) by mouth 2 times daily 30 tablet 1     cyclobenzaprine (FLEXERIL) 10 MG tablet Take 1 tablet (10 mg) by mouth 2 times daily as needed 20 tablet 0     enoxaparin (LOVENOX) 80 MG/0.8ML syringe Inject 0.7 mLs (70 mg) Subcutaneous every 12 hours (Patient taking  differently: Inject 60 mg Subcutaneous every 12 hours ) 60 Syringe 3     Furosemide (LASIX PO) Take 40 mg by mouth 2 times daily       simvastatin (ZOCOR) 40 MG tablet Take by mouth At Bedtime 30 tablet      garlic 150 MG TABS Take 150 mg by mouth daily       Ascorbic Acid (VITAMIN C PO) Take 500 mg by mouth daily       METOPROLOL TARTRATE PO Take 25 mg by mouth 2 times daily       Lactulose (KRISTALOSE PO) Take 10 g by mouth 3 times daily       MAGNESIUM OXIDE PO Take 400 mg by mouth 2 times daily       UNABLE TO FIND Take 400 mg by mouth 3 times daily MEDICATION NAME: cranberry fruit for UTI prevention       ferrous sulfate (IRON) 325 (65 FE) MG tablet Take by mouth daily (with breakfast)       multivitamin, therapeutic with minerals (MULTI-VITAMIN) TABS Take 1 tablet by mouth daily       acetaminophen (TYLENOL) 500 MG tablet Take 500-1,000 mg by mouth every 4 hours as needed. Max 8 tabs per day       cholecalciferol 1000 UNITS TABS Take 1 tablet by mouth daily.           Allergies:        Allergies   Allergen Reactions     Atorvastatin Other (See Comments)     Myalgias     Cisplatin Itching     Patient notes that her hands had red lesions with swelling and itchiness within 5-10 minutes of the cisplatin starting     Levofloxacin Other (See Comments)     Confusion        Social History:     Social History   Substance Use Topics     Smoking status: Never Smoker     Smokeless tobacco: Never Used     Alcohol use No       History   Drug Use No         Family History:     The patient's family history is notable for:    Family History   Problem Relation Age of Onset     Breast Cancer Sister 61     BRCA negative            Date Value Ref Range Status   01/16/2017 4052 (H) 0 - 30 U/mL Final     Comment:     Assay Method:  Chemiluminescence using Siemens Centaur XP   11/14/2016 3185 (H) 0 - 30 U/mL Final     Comment:     Assay Method:  Chemiluminescence using Siemens Centaur XP   10/20/2016 2910 (H) 0 - 30 U/mL Final      Comment:     Assay Method:  Chemiluminescence using Siemens Centaur XP   09/22/2016 3398 (H) 0 - 30 U/mL Final     Comment:     Assay Method:  Chemiluminescence using Siemens Centaur XP   09/07/2016 3207 (H) 0 - 30 U/mL Final     Comment:     Assay Method:  Chemiluminescence using Siemens Centaur XP   08/08/2016 3386 (H) 0 - 30 U/mL Final     Comment:     Assay Method:  Chemiluminescence using Siemens Centaur XP   07/18/2016 3381 (H) 0 - 30 U/mL Final   07/11/2016 3375 (H) 0 - 30 U/mL Final   06/13/2016 3764 (H) 0 - 30 U/mL Final   05/16/2016 3348 (H) 0 - 30 U/mL Final     Labs: 3/16/17  Sodium - 137  Potassium - 4.5  Chloride - 106  Bicarb - 20  BUN - 28  Creatine - 1.0  Calcium - 9.3  phophorous - 2.6  PTH - 58  Hemoglobin - 13.1      Physical Exam:     /66 (Cuff Size: Adult Small)  Pulse 79  Temp 94.4  F (34.7  C) (Oral)  Resp 16  Wt 47.4 kg (104 lb 8 oz)  SpO2 93%  BMI 19.11 kg/m2  Body mass index is 19.11 kg/(m^2).    General Appearance: Alert, in a wheel chair, cachectic, appears much more thin.      HEENT: no thyromegaly, no palpable nodules or masses        Cardiovascular: regular rate and rhythm, no gallops, loud systolic flow murmur unchanged from previously.     Respiratory: lungs clear, no rales, rhonchi or wheezes, decreased diaphragmatic excursion    Musculoskeletal: extremities non tender and without edema    Skin: no lesions or rashes     Neurological: normal gait, no gross defects     Psychiatric: appropriate mood and affect                               Hematological: normal cervical, supraclavicular lymph nodes     Gastrointestinal:  abdomen firm, bruising present on abdominal wall from injections. Large palpable tumor mass below umbilicus.    Genitourinary: Not indicated    Performance status-  3    Assessment:    Elayne Young is a 66 year old woman with a history of stage IV ovarian cancer, diagnosed in 2009. Chemotherapy agents used include Carbo/taxol, Doxil, Topotecan,  Gemcitabine, Cisplatin, Etoposide and weekly Taxol. She took a chemotherapy break Oct 2014. CT scan on 7/8/15 showed progression of disease, she started Letrozole and then completed 3 cycles of Carbo desensitization. CT on 11/6/15 showed interval progression/worsening of the neoplastic process, as indicated by increase in size of right adnexal mass lesion, retroperitoneal and mediastinal lymph nodes, and peritoneal/mesenteric tumor implants. Was taken off Tesaro on 9/22/16. Recently discovered 7-8 brain metastases in December 2016. Received subsequent radiation therapy and is now recovering. Also admitted to [facility] for SBO managed conservatively. Significant weight loss.    Of a 50 minute appointment, more than 50% was spent in counseling the patient.      Plan:     1.)        Recurrent persistent disease, brain metastases stable - at home hospice care and continued follow up with palliative care suggested at this time. Patient is unable to tolerate any further treatment.    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 She was negative for mutations in JESUS, BARD1, BLM, BRCA1, BRCA2, BRIP1, CDH1, CHEK2, EPCAM, NFX289G, FANCC, HOXB13, MLH1, MRE1  1A, MSH2, MSH6, NBN, PALB2, PMS2, PTEN, RAD50, RAD51C, RAD51D, STK11, TP53, XRCC2.     3.) Labs and/or tests ordered include:  none     4.) Health maintenance issues addressed today include annual health maintenance and non-gyn issues with PCP. Physical therapy recommended - will start doing exercises at home and will start therapy once she feels like she is in better health. Will continue on Lovenox - changed to 50 mg BID, will refill today.     5.)  Code status was discussed, she is still full code at this point. Advance directive was also addressed, recommended they complete that as soon as possible.     6.)  I have encouraged hospice care and completion of advanced  directives. Mr. Young says they have started but not discussed fully their advanced directives. They have not made a final decision yet. They understand that she is too ill to receive chemotherapy at this time. I told them to consider hospice care which they have not discussed. I explained that hospice care is able to come to their home to keep Elayne comfortable. Mr. Young has considered installing a chair that is able to take Elayne upstairs in their home. We suggested that they allow hospice to set up a bed in their living room to save money and to allow for Elayne to be much more accessible to care and to visitors. They seemed open to Hospice care coming to at least become comfortable with their home and to prepare for what they may need in the future. I have recommended she change her code status to DNR/DNI however they want to have a discussion regarding this. She and her  are aware of possibility of SBO and that if she stops having BMs or has vomiting she needs to be seen.    7.) DME order - Mr. Young requested order for chair lift for Elayne. Rx written at his request.      Thank you for allowing me to participate in the care of this patient.  If you have any questions or concerns, please do not hesitate to contact me.    Gema Moody MD    Department of Ob/Gyn and Women's Health  Division of Gynecologic Oncology  [facility]  [phone]        Patient Care Team:  Jason Roa MD as PCP - General (Pain Clinic)  Linda Russell, RN as Registered Nurse (Gyn-Onc)  Gema Moody MD as MD (Oncology)      I have reviewed the above note and agree with the scribe's notation as written.    I, Nahum Mayo, am serving as a scribe to document services personally performed by Gema Moody MD, based upon my observations and the provider's statements to me. All documentation has been reviewed by the aforementioned doctor prior to being  entered into the official medical record.

## 2017-03-28 NOTE — PROGRESS NOTES
Care Coordinator Note  Received a call from [facility]. They saw her today but she and her  did no want to enroll at this time.  They wanted to see the Palliative Care MD to make sure this is the right thing for her and that Palliative Care  would support that decision.  She will let us know when Elayne does enroll.      Linda HAIRSTON RN, OCN  Care Coordinator   Gynecologic Cancer   Office [phone]  Pager [phone]

## 2022-06-25 NOTE — MR AVS SNAPSHOT
After Visit Summary   3/27/2017    Elayne Young    MRN: [id number]           Patient Information     Date Of Birth          1950        Visit Information        Provider Department      3/27/2017 9:00 AM Gema Moody MD [facility]        Today's Diagnoses     Ovarian cancer, unspecified laterality (H)    -  1    Immobility        Weight loss        Chronic atrial fibrillation (H)        Malignant neoplasm of ovary, left (H)        Malignant neoplasm of ovary, right (H)           Follow-ups after your visit        Additional Services     DME REFERRAL       Please be aware that coverage of these services is subject to the terms and limitations of your health insurance plan.  Call member services at your health plan with any benefit or coverage questions.      Please bring the following to your appointment:  Any x-rays, CTs or MRIs which have been performed.  Contact the facility where they were done to arrange for  prior to your scheduled appointment.    List of current medications   This referral request   Any documents/labs given to you for this referral    Request for Stair lift due to patient immobility and unable to manage stairs.                  Who to contact     If you have questions or need follow up information about today's clinic visit or your schedule please contact [facility] directly at [phone].  Normal or non-critical lab and imaging results will be communicated to you by MyChart, letter or phone within 4 business days after the clinic has received the results. If you do not hear from us within 7 days, please contact the clinic through MyChart or phone. If you have a critical or abnormal lab result, we will notify you by phone as soon as possible.  Submit refill requests through [facility] or call your pharmacy and they will forward the refill request to us. Please allow 3 business days for your refill to be completed.           Additional Information About Your Visit        [employer] Information     [employer] gives you secure access to your electronic health record. If you see a primary care provider, you can also send messages to your care team and make appointments. If you have questions, please call your primary care clinic.  If you do not have a primary care provider, please call [phone] and they will assist you.      [employer] is an electronic gateway that provides easy, online access to your medical records. With [employer], you can request a clinic appointment, read your test results, renew a prescription or communicate with your care team.     To access your existing account, please contact your [facility] or call [phone] for assistance.        Care EveryWhere ID     This is your Care EveryWhere ID. This could be used by other organizations to access your [place] medical records  [id number]        Your Vitals Were     Pulse Temperature Respirations Pulse Oximetry BMI (Body Mass Index)       79 94.4  F (34.7  C) (Oral) 16 93% 19.11 kg/m2        Blood Pressure from Last 3 Encounters:   03/27/17 104/66   01/23/17 101/61   01/23/17 111/61    Weight from Last 3 Encounters:   03/27/17 47.4 kg (104 lb 8 oz)   02/06/17 51.1 kg (112 lb 9.6 oz)   01/17/17 49.2 kg (108 lb 8 oz)              We Performed the Following     DME REFERRAL          Today's Medication Changes          These changes are accurate as of: 3/27/17 11:59 PM.  If you have any questions, ask your nurse or doctor.               These medicines have changed or have updated prescriptions.        Dose/Directions    enoxaparin 80 MG/0.8ML injection   Commonly known as:  LOVENOX   This may have changed:  how much to take   Used for:  Chronic atrial fibrillation (H), Malignant neoplasm of ovary, left (H), Malignant neoplasm of ovary, right (H)   Changed by:  Gema Moody MD        Dose:  50 mg   Inject 0.5 mLs (50 mg) Subcutaneous  every 12 hours   Quantity:  50 Syringe   Refills:  3            Where to get your medicines      These medications were sent to [id number] - [place], [place] - [facility] [address] AT [facility] & [address] , [facility] [address]     Phone:  [phone]     enoxaparin 80 MG/0.8ML injection                Primary Care Provider Office Phone # Fax #    Jason Roa [phone]       [facility] [address] [place] [id number]        Thank you!     Thank you for choosing [facility]  for your care. Our goal is always to provide you with excellent care. Hearing back from our patients is one way we can continue to improve our services. Please take a few minutes to complete the written survey that you may receive in the mail after your visit with us. Thank you!             Your Updated Medication List - Protect others around you: Learn how to safely use, store and throw away your medicines at www.disposemymeds.org.          This list is accurate as of: 3/27/17 11:59 PM.  Always use your most recent med list.                   Brand Name Dispense Instructions for use    acetaminophen 500 MG tablet    TYLENOL     Take 500-1,000 mg by mouth every 4 hours as needed. Max 8 tabs per day       albuterol (2.5 MG/3ML) 0.083% neb solution      Take 1 vial by nebulization every 6 hours as needed for shortness of breath / dyspnea or wheezing       cholecalciferol 1000 UNITS Tabs      Take 1 tablet by mouth daily.       cyclobenzaprine 10 MG tablet    FLEXERIL    20 tablet    Take 1 tablet (10 mg) by mouth 2 times daily as needed       DAPSONE PO      Take 100 mg by mouth daily       dexamethasone 4 MG tablet    DECADRON         enoxaparin 80 MG/0.8ML injection    LOVENOX    50 Syringe    Inject 0.5 mLs (50 mg) Subcutaneous every 12 hours       ferrous sulfate 325 (65 FE) MG tablet    IRON     Take by mouth daily (with breakfast)  Reported on 3/27/2017       garlic 150 MG Tabs tablet      Take 150 mg by mouth daily       GENERLAC 10 GM/15ML solution   Generic drug:  lactulose      Reported on 3/27/2017       hyoscyamine 0.125 MG sublingual tablet    LEVSIN/SL     Place 0.125 mg under the tongue Reported on 3/27/2017       KRISTALOSE PO      Take 10 g by mouth 3 times daily Reported on 3/27/2017       LASIX PO      Take 40 mg by mouth 2 times daily Reported on 3/27/2017       LORazepam 0.5 MG tablet    ATIVAN    30 tablet    Take 1 tablet (0.5 mg) by mouth every 6 hours as needed for anxiety or other (nausea/vomiting, sleep)       MAGNESIUM OXIDE PO      Take 400 mg by mouth 2 times daily Reported on 3/27/2017       METOPROLOL TARTRATE PO      Take 25 mg by mouth 2 times daily       Multi-vitamin Tabs tablet      Take 1 tablet by mouth daily       pantoprazole 40 MG EC tablet    PROTONIX    30 tablet    Take 1 tablet (40 mg) by mouth daily Take 30-60 minutes before a meal.       potassium chloride 10 MEQ tablet    K-TAB,KLOR-CON    30 tablet    Take 2 tablets (20 mEq) by mouth 2 times daily       potassium chloride SA 10 MEQ CR tablet    K-DUR/KLOR-CON M     Reported on 3/27/2017       prochlorperazine 10 MG tablet    COMPAZINE    30 tablet    Take 0.5 tablets (5 mg) by mouth every 6 hours as needed for nausea or vomiting       sertraline 50 MG tablet    ZOLOFT         simvastatin 40 MG tablet    ZOCOR    30 tablet    Take by mouth At Bedtime Reported on 3/27/2017       UNABLE TO FIND      Take 400 mg by mouth 3 times daily MEDICATION NAME: cranberry fruit for UTI prevention       VITAMIN C PO      Take 500 mg by mouth daily Reported on 3/27/2017       ZYPREXA PO      Take 2.5 mg by mouth daily          <-- Click to add NO pertinent Past Medical History